# Patient Record
Sex: FEMALE | Race: WHITE | Employment: OTHER | URBAN - METROPOLITAN AREA
[De-identification: names, ages, dates, MRNs, and addresses within clinical notes are randomized per-mention and may not be internally consistent; named-entity substitution may affect disease eponyms.]

---

## 2017-01-18 ENCOUNTER — OFFICE VISIT (OUTPATIENT)
Dept: BEHAVIORAL/MENTAL HEALTH CLINIC | Age: 77
End: 2017-01-18

## 2017-01-18 VITALS
DIASTOLIC BLOOD PRESSURE: 83 MMHG | WEIGHT: 179 LBS | OXYGEN SATURATION: 92 % | HEIGHT: 64 IN | BODY MASS INDEX: 30.56 KG/M2 | SYSTOLIC BLOOD PRESSURE: 136 MMHG | HEART RATE: 99 BPM

## 2017-01-18 DIAGNOSIS — F60.9 PERSONALITY DISORDER (HCC): ICD-10-CM

## 2017-01-18 DIAGNOSIS — F41.8 DEPRESSION WITH ANXIETY: Primary | ICD-10-CM

## 2017-01-18 RX ORDER — ARIPIPRAZOLE 5 MG/1
TABLET ORAL
Refills: 2 | COMMUNITY
Start: 2016-12-29 | End: 2017-01-18 | Stop reason: SDUPTHER

## 2017-01-18 RX ORDER — VENLAFAXINE HYDROCHLORIDE 150 MG/1
CAPSULE, EXTENDED RELEASE ORAL
Qty: 30 CAP | Refills: 3 | Status: SHIPPED | OUTPATIENT
Start: 2017-01-18 | End: 2017-06-12 | Stop reason: SDUPTHER

## 2017-01-18 RX ORDER — ARIPIPRAZOLE 5 MG/1
TABLET ORAL
Qty: 30 TAB | Refills: 2 | Status: SHIPPED | OUTPATIENT
Start: 2017-01-18 | End: 2017-07-26 | Stop reason: SDUPTHER

## 2017-01-18 NOTE — PROGRESS NOTES
Psychiatric Outpatient Progress Note    Account Number:  [id number]  Name: Corinna Caro    SUBJECTIVE:   CHIEF COMPLAINT:  Corinna Caro is a 67 y.o. , white female and was seen today for follow-up of psychiatric condition and psychotropic medication management. She was brought by her visit. HPI:    Kaylah Zaragoza reports the following psychiatric symptoms:  depression and anxiety. The symptoms have been present for years and are of mild severity. The symptoms occur infrequently. Pt reports doing well. She had called on 12/6/17 with c/o tremors of UE. She saw her neurologist, who ruled out that se has PD and suggested that it may be due to Abilify. She was told to hold it for few weeks and see if that was the case. She reports that her mild tremers of fingers does not interfere with any fine functions of fingers like wearing clothes, holding utensils or cups etc. She was told that completely stopping Abilify may worsen her depression. No gross psychosis or [place]. Stays busy with house chores. Stable sleep/appetite. Contributing factors include: new onset of tremors. Patient denies SI/HI/SIB. Side Effects:  none      Fam/Soc Hx (from [place] with updates):       REVIEW OF SYSTEMS:  Psychiatric:  depression  Appetite:good   Sleep: good       OBJECTIVE:                 Mental Status exam: WNL except for      Sensorium  oriented to time, place and person   Relations cooperative and passive    Eye Contact    appropriate   Appearance:  age appropriate and casually dressed   Motor Behavior/Gait:  hypoactive   Speech:  normal pitch and normal volume   Thought Process: goal directed and logical   Thought Content free of delusions and free of hallucinations   Suicidal ideations none   Homicidal ideations none   Mood:  euthymic   Affect:  anxious   Memory recent  adequate   Memory remote:  adequate   Concentration:  adequate   Abstraction:  concrete   Insight:  fair   Reliability fair   Judgment:  fair MEDICAL DECISION MAKING  Data: pertinent labs, imaging, medical records and diagnostic tests reviewed and incorporated in diagnosis and treatment plan    No Known Allergies     Current Outpatient Prescriptions   Medication Sig Dispense Refill    ARIPiprazole (ABILIFY) 5 mg tablet 15TAKE 1/2 TABLET BY MOUTH EVERY DAY  Indications: DEPRESSION TREATMENT ADJUNCT 30 Tab 2    venlafaxine-SR (EFFEXOR-XR) 150 mg capsule TAKE ONE CAPSULE BY MOUTH AM after breakfast  Indications: ANXIETY WITH DEPRESSION 30 Cap 3    topiramate (TOPAMAX) 100 mg tablet Take 100 mg by mouth daily. Indications: trigeminal neuralgia  5    atorvastatin (LIPITOR) 20 mg tablet Take 20 mg by mouth daily. Indications: HYPERCHOLESTEROLEMIA      cholecalciferol (VITAMIN D3) 400 unit tab tablet Take 1,200 Units by mouth daily. Indications: VITAMIN D DEFICIENCY      aspirin delayed-release 325 mg tablet Take 325 mg by mouth daily. Indications: MYOCARDIAL INFARCTION PREVENTION      levETIRAcetam (KEPPRA) 500 mg tablet Take 500 mg by mouth daily. Indications: atypical trigeminal neuralgia      multivitamins-minerals-lutein (CENTRUM SILVER) tab tablet Take 1 Tab by mouth daily.  levothyroxine (SYNTHROID) 88 mcg tablet Take 88 mcg by mouth Daily (before breakfast). Indications: HYPOTHYROIDISM          Visit Vitals    /83 (BP 1 Location: Left arm, BP Patient Position: Sitting)    Pulse 99    Ht 5' 4\" (1.626 m)    Wt 81.2 kg (179 lb)    SpO2 92%    BMI 30.73 kg/m2         Problems addressed today:    ICD-10-CM ICD-9-CM    1. Depression with anxiety F41.8 300.4    2. Personality disorder F60.9 301.9        Assessment:   Mervin Apple  is a 67 y.o.  female  is responding to treatment. Symptoms are stable. Patient denies SI/HI/SIB. No evidence of AH/VH or delusions. Risk Scoring- chronic illnesses and prescription drug management    Treatment Plan:  1.   Medications:          Medication Changes/Adjustments: resume Abilify, continue Effexor in the current dose. Current Outpatient Prescriptions   Medication Sig Dispense Refill    ARIPiprazole (ABILIFY) 5 mg tablet 15TAKE 1/2 TABLET BY MOUTH EVERY DAY  Indications: DEPRESSION TREATMENT ADJUNCT 30 Tab 2    venlafaxine-SR (EFFEXOR-XR) 150 mg capsule TAKE ONE CAPSULE BY MOUTH AM after breakfast  Indications: ANXIETY WITH DEPRESSION 30 Cap 3    topiramate (TOPAMAX) 100 mg tablet Take 100 mg by mouth daily. Indications: trigeminal neuralgia  5    atorvastatin (LIPITOR) 20 mg tablet Take 20 mg by mouth daily. Indications: HYPERCHOLESTEROLEMIA      cholecalciferol (VITAMIN D3) 400 unit tab tablet Take 1,200 Units by mouth daily. Indications: VITAMIN D DEFICIENCY      aspirin delayed-release 325 mg tablet Take 325 mg by mouth daily. Indications: MYOCARDIAL INFARCTION PREVENTION      levETIRAcetam (KEPPRA) 500 mg tablet Take 500 mg by mouth daily. Indications: atypical trigeminal neuralgia      multivitamins-minerals-lutein (CENTRUM SILVER) tab tablet Take 1 Tab by mouth daily.  levothyroxine (SYNTHROID) 88 mcg tablet Take 88 mcg by mouth Daily (before breakfast). Indications: HYPOTHYROIDISM                    The following regarding medications was addressed:    (The risks and benefits of the proposed medication; the potential medication side effects ie    dry mouth, weight gain, GI upset, headache; patient given opportunity to ask questions)       2. Counseling and coordination of care including instructions for treatment, risks/benefits, risk factor reduction and patient/family education. She agrees with the plan. Patient instructed to call with any side effects, questions or issues. 3.  Follow-up Disposition:  Return in about 3 months (around 4/18/2017). PSYCHOTHERAPY:  approx 20 minutes  Type:  Supportive/Solution Focused psychotherapy provided  Focus:     Current problems:    Medical issues     Psychoeducation provided on the common causes of tremors.      Treatment plan reviewed with patient-including diagnosis and medications      Juwan Booker is progressing.     Glenn Adams MD  1/18/2017

## 2017-04-19 ENCOUNTER — OFFICE VISIT (OUTPATIENT)
Dept: BEHAVIORAL/MENTAL HEALTH CLINIC | Age: 77
End: 2017-04-19

## 2017-04-19 VITALS
SYSTOLIC BLOOD PRESSURE: 143 MMHG | DIASTOLIC BLOOD PRESSURE: 83 MMHG | BODY MASS INDEX: 30.56 KG/M2 | HEART RATE: 98 BPM | OXYGEN SATURATION: 98 % | WEIGHT: 179 LBS | HEIGHT: 64 IN

## 2017-04-19 DIAGNOSIS — F41.8 DEPRESSION WITH ANXIETY: Primary | ICD-10-CM

## 2017-04-19 DIAGNOSIS — F60.9 PERSONALITY DISORDER (HCC): ICD-10-CM

## 2017-04-19 PROBLEM — F32.A DEPRESSION: Status: ACTIVE | Noted: 2017-04-19

## 2017-04-19 PROBLEM — E07.9 THYROID DISEASE: Status: ACTIVE | Noted: 2017-04-19

## 2017-04-19 PROBLEM — G50.0 TRIGEMINAL NEURALGIA: Status: ACTIVE | Noted: 2017-04-19

## 2017-04-19 PROBLEM — E78.00 HYPERCHOLESTEROLEMIA: Status: ACTIVE | Noted: 2017-04-19

## 2017-04-19 NOTE — PROGRESS NOTES
Psychiatric Outpatient Progress Note    Account Number:  [id number]  Name: Jey Hook    SUBJECTIVE:   CHIEF COMPLAINT:  Jey Hook is a 65 y.o. , white female and was seen today for follow-up of psychiatric condition and psychotropic medication management. She was brought by her       HPI:   Arjun Palacio reports the following psychiatric symptoms: depression and anxiety. The symptoms have been present for years and are of mild severity. The symptoms occur infrequently. Pt reports doing well. Reports her Trigeminal neuralgia has quiet down with the combination of Keppra and Topamax. Stable sleep/appetite. Reports she is more active now and has started to drive short distances.      Contributing factors include: new onset of tremors.    Patient denies SI/HI/SIB.      Side Effects: none      Fam/Soc Hx (from [place] with updates):      REVIEW OF SYSTEMS:  Psychiatric: depression  Appetite:good   Sleep: good        OBJECTIVE:                 Mental Status exam: WNL except for      Sensorium  oriented to time, place and person   Relations cooperative    Eye Contact    appropriate   Appearance:  age appropriate, casually dressed and overweight   Motor Behavior/Gait:  within normal limits   Speech:  normal pitch and normal volume   Thought Process: goal directed and logical   Thought Content free of delusions and free of hallucinations   Suicidal ideations none   Homicidal ideations none   Mood:  euthymic   Affect:  full range   Memory recent  adequate   Memory remote:  adequate   Concentration:  adequate   Abstraction:  concrete   Insight:  fair   Reliability fair   Judgment:  fair       MEDICAL DECISION MAKING  Data: pertinent labs, imaging, medical records and diagnostic tests reviewed and incorporated in diagnosis and treatment plan    No Known Allergies     Current Outpatient Prescriptions   Medication Sig Dispense Refill    ARIPiprazole (ABILIFY) 5 mg tablet 15TAKE 1/2 TABLET BY MOUTH EVERY DAY  Indications: DEPRESSION TREATMENT ADJUNCT 30 Tab 2    venlafaxine-SR (EFFEXOR-XR) 150 mg capsule TAKE ONE CAPSULE BY MOUTH AM after breakfast  Indications: ANXIETY WITH DEPRESSION 30 Cap 3    topiramate (TOPAMAX) 100 mg tablet Take 100 mg by mouth daily. Indications: trigeminal neuralgia  5    atorvastatin (LIPITOR) 20 mg tablet Take 20 mg by mouth daily. Indications: HYPERCHOLESTEROLEMIA      cholecalciferol (VITAMIN D3) 400 unit tab tablet Take 1,200 Units by mouth daily. Indications: VITAMIN D DEFICIENCY      aspirin delayed-release 325 mg tablet Take 325 mg by mouth daily. Indications: MYOCARDIAL INFARCTION PREVENTION      levETIRAcetam (KEPPRA) 500 mg tablet Take 500 mg by mouth daily. Indications: atypical trigeminal neuralgia      multivitamins-minerals-lutein (CENTRUM SILVER) tab tablet Take 1 Tab by mouth daily.  levothyroxine (SYNTHROID) 88 mcg tablet Take 88 mcg by mouth Daily (before breakfast). Indications: HYPOTHYROIDISM          Visit Vitals    /83 (BP 1 Location: Left arm, BP Patient Position: Sitting)    Pulse 98    Ht 5' 4\" (1.626 m)    Wt 81.2 kg (179 lb)    SpO2 98%    BMI 30.73 kg/m2         Problems addressed today:    ICD-10-CM ICD-9-CM    1. Depression with anxiety F41.8 300.4    2. Personality disorder F60.9 301.9        Assessment:   Ja Angel  is a 65 y.o.  female  is responding to treatment. Symptoms are stable. Patient denies SI/HI/SIB. No evidence of AH/VH or delusions. Risk Scoring- chronic illnesses and prescription drug management    Treatment Plan:  1. Medications:          Medication Changes/Adjustments: Continue Effexor and Abilify in the current dosages.      Current Outpatient Prescriptions   Medication Sig Dispense Refill    ARIPiprazole (ABILIFY) 5 mg tablet 15TAKE 1/2 TABLET BY MOUTH EVERY DAY  Indications: DEPRESSION TREATMENT ADJUNCT 30 Tab 2    venlafaxine-SR (EFFEXOR-XR) 150 mg capsule TAKE ONE CAPSULE BY MOUTH AM after breakfast  Indications: ANXIETY WITH DEPRESSION 30 Cap 3    topiramate (TOPAMAX) 100 mg tablet Take 100 mg by mouth daily. Indications: trigeminal neuralgia  5    atorvastatin (LIPITOR) 20 mg tablet Take 20 mg by mouth daily. Indications: HYPERCHOLESTEROLEMIA      cholecalciferol (VITAMIN D3) 400 unit tab tablet Take 1,200 Units by mouth daily. Indications: VITAMIN D DEFICIENCY      aspirin delayed-release 325 mg tablet Take 325 mg by mouth daily. Indications: MYOCARDIAL INFARCTION PREVENTION      levETIRAcetam (KEPPRA) 500 mg tablet Take 500 mg by mouth daily. Indications: atypical trigeminal neuralgia      multivitamins-minerals-lutein (CENTRUM SILVER) tab tablet Take 1 Tab by mouth daily.  levothyroxine (SYNTHROID) 88 mcg tablet Take 88 mcg by mouth Daily (before breakfast). Indications: HYPOTHYROIDISM                    The following regarding medications was addressed:    (The risks and benefits of the proposed medication; the potential medication side effects ie    dry mouth, weight gain, GI upset, headache; patient given opportunity to ask questions)       2. Counseling and coordination of care including instructions for treatment, risks/benefits, risk factor reduction and patient/family education. She agrees with the plan. Patient instructed to call with any side effects, questions or issues. 3.  Follow-up Disposition:  Return in about 4 months (around 8/19/2017). 4. Encouraged daily walks and portion control. PSYCHOTHERAPY:  approx 20 minutes  Type:  Supportive/Cognitive Behavioral/Solution Focused psychotherapy provided  Focus:     Current problems:   Medical issues- obesity     Psychoeducation provided on psych medications. Treatment plan reviewed with patient-including diagnosis and medications      Soraida Johannaabimaels is slowly  progressing.     Mckenna Burnett MD  4/19/2017

## 2017-06-12 RX ORDER — VENLAFAXINE HYDROCHLORIDE 150 MG/1
CAPSULE, EXTENDED RELEASE ORAL
Qty: 30 CAP | Refills: 3 | Status: SHIPPED | OUTPATIENT
Start: 2017-06-12 | End: 2017-10-14 | Stop reason: SDUPTHER

## 2017-07-26 RX ORDER — ARIPIPRAZOLE 5 MG/1
TABLET ORAL
Qty: 30 TAB | Refills: 0 | Status: SHIPPED | OUTPATIENT
Start: 2017-07-26 | End: 2017-10-06 | Stop reason: SDUPTHER

## 2017-08-21 ENCOUNTER — OFFICE VISIT (OUTPATIENT)
Dept: BEHAVIORAL/MENTAL HEALTH CLINIC | Age: 77
End: 2017-08-21

## 2017-08-21 VITALS
HEIGHT: 64 IN | WEIGHT: 180 LBS | SYSTOLIC BLOOD PRESSURE: 135 MMHG | BODY MASS INDEX: 30.73 KG/M2 | DIASTOLIC BLOOD PRESSURE: 85 MMHG | HEART RATE: 102 BPM | OXYGEN SATURATION: 96 %

## 2017-08-21 DIAGNOSIS — F60.9 PERSONALITY DISORDER (HCC): ICD-10-CM

## 2017-08-21 DIAGNOSIS — F41.8 DEPRESSION WITH ANXIETY: Primary | ICD-10-CM

## 2017-08-21 NOTE — PROGRESS NOTES
Psychiatric Outpatient Progress Note    Account Number:  [id number]  Name: Abad Cousin    SUBJECTIVE:   CHIEF COMPLAINT:  Alpha Cousin is a 65 y.o. , white female and was seen today for follow-up of psychiatric condition and psychotropic medication management. She was brought by her        HPI:   Jayashree Lou reports the following psychiatric symptoms: depression and anxiety. The symptoms have been present for years and are of mild severity. The symptoms occur infrequently. Pt reports doing well. Stable sleep/appetite. They have been travelling some locally. Reports her Trigeminal neuralgia has quiet down with the combination of Keppra and Topamax. Reports she is more active now and has started to drive short distances.       Contributing factors include: new onset of tremors. Patient denies SI/HI/SIB.        Side Effects: none       Fam/Soc Hx (from [place] with updates):       REVIEW OF SYSTEMS:  Psychiatric: depression  Appetite:good   Sleep: good        OBJECTIVE:                 Mental Status exam: WNL except for      Sensorium  oriented to time, place and person   Relations cooperative    Eye Contact    appropriate   Appearance:  age appropriate and casually dressed   Motor Behavior/Gait:  within normal limits   Speech:  normal pitch and normal volume   Thought Process: goal directed and logical   Thought Content free of delusions and free of hallucinations   Suicidal ideations none   Homicidal ideations none   Mood:  euthymic   Affect:  full range   Memory recent  adequate   Memory remote:  adequate   Concentration:  adequate   Abstraction:  concrete   Insight:  fair   Reliability fair   Judgment:  fair       MEDICAL DECISION MAKING  Data: pertinent labs, imaging, medical records and diagnostic tests reviewed and incorporated in diagnosis and treatment plan    No Known Allergies     Current Outpatient Prescriptions   Medication Sig Dispense Refill    ARIPiprazole (ABILIFY) 5 mg tablet TAKE 1/2 TABLET BY MOUTH EVERY DAY, INDICATIONS: DEPRESSION TREATMENT ADJUNCT 30 Tab 0    venlafaxine-SR (EFFEXOR-XR) 150 mg capsule TAKE ONE CAPSULE BY MOUTH AM after breakfast  Indications: ANXIETY WITH DEPRESSION 30 Cap 3    topiramate (TOPAMAX) 100 mg tablet Take 100 mg by mouth daily. Indications: trigeminal neuralgia  5    atorvastatin (LIPITOR) 20 mg tablet Take 20 mg by mouth daily. Indications: HYPERCHOLESTEROLEMIA      cholecalciferol (VITAMIN D3) 400 unit tab tablet Take 1,200 Units by mouth daily. Indications: VITAMIN D DEFICIENCY      aspirin delayed-release 325 mg tablet Take 325 mg by mouth daily. Indications: MYOCARDIAL INFARCTION PREVENTION      levETIRAcetam (KEPPRA) 500 mg tablet Take 500 mg by mouth daily. Indications: atypical trigeminal neuralgia      multivitamins-minerals-lutein (CENTRUM SILVER) tab tablet Take 1 Tab by mouth daily.  levothyroxine (SYNTHROID) 88 mcg tablet Take 88 mcg by mouth Daily (before breakfast). Indications: HYPOTHYROIDISM          Visit Vitals    /85 (BP 1 Location: Left arm, BP Patient Position: Sitting)    Pulse (!) 102    Ht 5' 4\" (1.626 m)    Wt 81.6 kg (180 lb)    SpO2 96%    BMI 30.9 kg/m2         Problems addressed today:    ICD-10-CM ICD-9-CM    1. Depression with anxiety F41.8 300.4    2. Personality disorder F60.9 301.9        Assessment:   Alpha Cousin  is a 65 y.o.  female  is responding to treatment. Symptoms are stable. Patient denies SI/HI/SIB. No evidence of AH/VH or delusions. Risk Scoring- chronic illnesses and prescription drug management    Treatment Plan:  1.   Medications:          Medication Changes/Adjustments: continue current combination of Effexor and Abilify    Current Outpatient Prescriptions   Medication Sig Dispense Refill    ARIPiprazole (ABILIFY) 5 mg tablet TAKE 1/2 TABLET BY MOUTH EVERY DAY, INDICATIONS: DEPRESSION TREATMENT ADJUNCT 30 Tab 0    venlafaxine-SR (EFFEXOR-XR) 150 mg capsule TAKE ONE CAPSULE BY MOUTH AM after breakfast  Indications: ANXIETY WITH DEPRESSION 30 Cap 3    topiramate (TOPAMAX) 100 mg tablet Take 100 mg by mouth daily. Indications: trigeminal neuralgia  5    atorvastatin (LIPITOR) 20 mg tablet Take 20 mg by mouth daily. Indications: HYPERCHOLESTEROLEMIA      cholecalciferol (VITAMIN D3) 400 unit tab tablet Take 1,200 Units by mouth daily. Indications: VITAMIN D DEFICIENCY      aspirin delayed-release 325 mg tablet Take 325 mg by mouth daily. Indications: MYOCARDIAL INFARCTION PREVENTION      levETIRAcetam (KEPPRA) 500 mg tablet Take 500 mg by mouth daily. Indications: atypical trigeminal neuralgia      multivitamins-minerals-lutein (CENTRUM SILVER) tab tablet Take 1 Tab by mouth daily.  levothyroxine (SYNTHROID) 88 mcg tablet Take 88 mcg by mouth Daily (before breakfast). Indications: HYPOTHYROIDISM                    The following regarding medications was addressed:    (The risks and benefits of the proposed medication; the potential medication side effects ie    dry mouth, weight gain, GI upset, headache; patient given opportunity to ask questions)       2. Counseling and coordination of care including instructions for treatment, risks/benefits, risk factor reduction and patient/family education. She agrees with the plan. Patient instructed to call with any side effects, questions or issues. 3.  Follow-up Disposition:  Return in about 4 months (around 12/21/2017). PSYCHOTHERAPY:  approx 20 minutes  Type:  Supportive/Solution Focused psychotherapy provided  Focus:     Current problems:     Psychoeducation provided on psych medications    Treatment plan reviewed with patient-including diagnosis and medications      Cesar Pierre is stable.       Kosta Boone MD  8/21/2017

## 2017-10-06 RX ORDER — ARIPIPRAZOLE 5 MG/1
TABLET ORAL
Qty: 30 TAB | Refills: 0 | Status: SHIPPED | OUTPATIENT
Start: 2017-10-06 | End: 2017-11-20 | Stop reason: SDUPTHER

## 2017-10-16 RX ORDER — VENLAFAXINE HYDROCHLORIDE 150 MG/1
CAPSULE, EXTENDED RELEASE ORAL
Qty: 30 CAP | Refills: 3 | Status: SHIPPED | OUTPATIENT
Start: 2017-10-16 | End: 2018-03-04 | Stop reason: SDUPTHER

## 2017-11-20 ENCOUNTER — OFFICE VISIT (OUTPATIENT)
Dept: BEHAVIORAL/MENTAL HEALTH CLINIC | Age: 77
End: 2017-11-20

## 2017-11-20 VITALS
SYSTOLIC BLOOD PRESSURE: 141 MMHG | HEIGHT: 64 IN | DIASTOLIC BLOOD PRESSURE: 93 MMHG | OXYGEN SATURATION: 94 % | BODY MASS INDEX: 30.73 KG/M2 | HEART RATE: 95 BPM | WEIGHT: 180 LBS

## 2017-11-20 DIAGNOSIS — F41.8 DEPRESSION WITH ANXIETY: Primary | ICD-10-CM

## 2017-11-20 DIAGNOSIS — F60.9 PERSONALITY DISORDER (HCC): ICD-10-CM

## 2017-11-20 RX ORDER — ARIPIPRAZOLE 5 MG/1
TABLET ORAL
Qty: 30 TAB | Refills: 3 | Status: SHIPPED | OUTPATIENT
Start: 2017-11-20 | End: 2018-09-11 | Stop reason: SDUPTHER

## 2017-11-20 NOTE — PROGRESS NOTES
Psychiatric Outpatient Progress Note    Account Number:  [id number]  Name: Hernan Willis    SUBJECTIVE:   CHIEF COMPLAINT:  Hernan Willis is a 77 y.o. , white female and was seen today for follow-up of psychiatric condition and psychotropic medication management. She was brought by her        HPI:   Brett reports the following psychiatric symptoms: depression and anxiety. The symptoms have been present for years and are of mild-moderate severity. The symptoms occur infrequently. Pt reports doing Ok. She was Dx with COPD recently after going thru testing by pulmonologist for increasing SOB over past few months. She is still under investigation and was started on steroid inhaler. She was cleared by the cardiologist for any serious cardiac illness. Pt reported that she stopped smoking 15 years ago but was heavy smoker for a long time. Reports sleeping and eating well. Compliant with medications. Stable weight. Denies any psychosis or [place].       Contributing factors include: new COPD dx  Patient denies SI/HI/SIB.        Side Effects: none       Fam/Soc Hx (from [facility] with updates):       REVIEW OF SYSTEMS:  Constitutional: positive for fatigue  Respiratory: positive for dyspnea on exertion  Cardiovascular: negative for chest pain, palpitations  Musculoskeletal:positive for myalgias and arthralgias  Neurological: positive for memory problems and gait problems  Behavioral/Psych: positive for anxiety, negative for sleep disturbance and tobacco use  Appetite:good   Sleep: good   Visit Vitals    BP (!) 141/93 (BP 1 Location: Left arm, BP Patient Position: Sitting)    Pulse 95    Ht 5' 4\" (1.626 m)    Wt 81.6 kg (180 lb)    SpO2 94%    BMI 30.9 kg/m2       OBJECTIVE:                 Mental Status exam: WNL except for      Sensorium  oriented to time, place and person   Relations cooperative and passive    Eye Contact    appropriate   Appearance:  age appropriate, casually dressed and overweight   Motor Behavior/Gait:  hypoactive and gait unsteady   Speech:  normal pitch and normal volume   Thought Process: goal directed and logical   Thought Content free of delusions and free of hallucinations   Suicidal ideations none   Homicidal ideations none   Mood:  euthymic   Affect:  anxious   Memory recent  impaired-mild   Memory remote:  adequate   Concentration:  adequate   Abstraction:  concrete   Insight:  fair   Reliability fair   Judgment:  fair       MEDICAL DECISION MAKING  Data: pertinent labs, imaging, medical records and diagnostic tests reviewed and incorporated in diagnosis and treatment plan    No Known Allergies     Current Outpatient Prescriptions   Medication Sig Dispense Refill    ARIPiprazole (ABILIFY) 5 mg tablet TAKE 1/2 TABLET BY MOUTH EVERY DAY, INDICATIONS: DEPRESSION TREATMENT ADJUNCT 30 Tab 3    venlafaxine-SR (EFFEXOR-XR) 150 mg capsule TAKE ONE CAPSULE BY MOUTH EVERY MORNING AFTER BREAKFAST 30 Cap 3    topiramate (TOPAMAX) 100 mg tablet Take 100 mg by mouth daily. Indications: trigeminal neuralgia  5    atorvastatin (LIPITOR) 20 mg tablet Take 20 mg by mouth daily. Indications: HYPERCHOLESTEROLEMIA      cholecalciferol (VITAMIN D3) 400 unit tab tablet Take 1,200 Units by mouth daily. Indications: VITAMIN D DEFICIENCY      aspirin delayed-release 325 mg tablet Take 325 mg by mouth daily. Indications: MYOCARDIAL INFARCTION PREVENTION      levETIRAcetam (KEPPRA) 500 mg tablet Take 500 mg by mouth daily. Indications: atypical trigeminal neuralgia      multivitamins-minerals-lutein (CENTRUM SILVER) tab tablet Take 1 Tab by mouth daily.  levothyroxine (SYNTHROID) 88 mcg tablet Take 88 mcg by mouth Daily (before breakfast). Indications: HYPOTHYROIDISM            Problems addressed today:    ICD-10-CM ICD-9-CM    1. Depression with anxiety F41.8 300.4    2.  Personality disorder F60.9 301.9        Assessment:   Camelia Anderson  is a 65 y.o.  female  is responding to treatment. Symptoms are stable. Patient denies SI/HI/SIB. No evidence of AH/VH or delusions. Risk Scoring- chronic illnesses and prescription drug management    Treatment Plan:  1. Medications:          Medication Changes/Adjustments: Continue Abilify and Effexor in the current dosages. Current Outpatient Prescriptions   Medication Sig Dispense Refill    ARIPiprazole (ABILIFY) 5 mg tablet TAKE 1/2 TABLET BY MOUTH EVERY DAY, INDICATIONS: DEPRESSION TREATMENT ADJUNCT 30 Tab 3    venlafaxine-SR (EFFEXOR-XR) 150 mg capsule TAKE ONE CAPSULE BY MOUTH EVERY MORNING AFTER BREAKFAST 30 Cap 3    topiramate (TOPAMAX) 100 mg tablet Take 100 mg by mouth daily. Indications: trigeminal neuralgia  5    atorvastatin (LIPITOR) 20 mg tablet Take 20 mg by mouth daily. Indications: HYPERCHOLESTEROLEMIA      cholecalciferol (VITAMIN D3) 400 unit tab tablet Take 1,200 Units by mouth daily. Indications: VITAMIN D DEFICIENCY      aspirin delayed-release 325 mg tablet Take 325 mg by mouth daily. Indications: MYOCARDIAL INFARCTION PREVENTION      levETIRAcetam (KEPPRA) 500 mg tablet Take 500 mg by mouth daily. Indications: atypical trigeminal neuralgia      multivitamins-minerals-lutein (CENTRUM SILVER) tab tablet Take 1 Tab by mouth daily.  levothyroxine (SYNTHROID) 88 mcg tablet Take 88 mcg by mouth Daily (before breakfast). Indications: HYPOTHYROIDISM                    The following regarding medications was addressed:    (The risks and benefits of the proposed medication; the potential medication side effects ie    dry mouth, weight gain, GI upset, headache; patient given opportunity to ask questions)       2. Counseling and coordination of care including instructions for treatment, risks/benefits, risk factor reduction and patient/family education. She agrees with the plan. Patient instructed to call with any side effects, questions or issues.      3. Pt and  were provided supportive counseling on their stress of new Dx of COPD and her limited activity due to SOB on exertion. Some coping strategies discussed. PSYCHOTHERAPY:  approx 20 minutes  Type:  Supportive/Cognitive Behavioral/Solution Focused psychotherapy provided  Focus:     Current problems:   Medical issues       Psychoeducation provided:  Psych medications    Treatment plan reviewed with patient-including diagnosis and medications    Aga Walsh is stable. Follow-up Disposition:  Return in about 4 months (around 3/20/2018).       Anna Whitaker MD  11/20/2017

## 2017-12-27 ENCOUNTER — HOSPITAL ENCOUNTER (OUTPATIENT)
Dept: CT IMAGING | Age: 77
Discharge: HOME | End: 2017-12-27
Attending: NURSE PRACTITIONER
Payer: MEDICARE

## 2017-12-27 DIAGNOSIS — R94.2 DECREASED DIFFUSION CAPACITY OF LUNG: ICD-10-CM

## 2017-12-27 PROCEDURE — 71250 CT THORAX DX C-: CPT

## 2018-04-09 ENCOUNTER — OFFICE VISIT (OUTPATIENT)
Dept: BEHAVIORAL/MENTAL HEALTH CLINIC | Age: 78
End: 2018-04-09

## 2018-04-09 VITALS
HEIGHT: 64 IN | SYSTOLIC BLOOD PRESSURE: 149 MMHG | DIASTOLIC BLOOD PRESSURE: 83 MMHG | BODY MASS INDEX: 30.05 KG/M2 | HEART RATE: 111 BPM | WEIGHT: 176 LBS

## 2018-04-09 DIAGNOSIS — F41.8 DEPRESSION WITH ANXIETY: Primary | ICD-10-CM

## 2018-04-09 DIAGNOSIS — F60.9 PERSONALITY DISORDER (HCC): ICD-10-CM

## 2018-04-09 PROBLEM — F32.A DEPRESSION: Status: RESOLVED | Noted: 2017-04-19 | Resolved: 2018-04-09

## 2018-04-09 RX ORDER — VENLAFAXINE HYDROCHLORIDE 150 MG/1
CAPSULE, EXTENDED RELEASE ORAL
Qty: 90 CAP | Refills: 0 | Status: SHIPPED | OUTPATIENT
Start: 2018-04-09 | End: 2018-04-18 | Stop reason: SDUPTHER

## 2018-04-09 NOTE — MR AVS SNAPSHOT
[address] [facility] 
 
 
 [address] [facility] [address] [place] [address] 
[phone] Patient: Beata Chambers MRN: [id number] Visit Information Date & Time Provider Department Dept. Phone Encounter #  
 4/9/2018  2:00 PM Ezio Tamez MD [facility]  Follow-up Instructions Return in about 4 months (around 8/9/2018). Your Appointments 8/8/2018  2:00 PM  
ESTABLISHED PATIENT with Ezio Tamez MD  
[facility] Appt Note: 4 month follow-up [address] [facility] [address] [place] [address] [facility] [address]  
  
   
 [address] [facility] [address] [facility] [address] [id number] Upcoming Health Maintenance Date Due DTaP/Tdap/Td series (1 - Tdap) 2/1/1960 ZOSTER VACCINE AGE 60> 12/1/1998 GLAUCOMA SCREENING Q2Y 2/1/2004 Bone Densitometry (Dexa) Screening 2/1/2004 Pneumococcal 65+ Low/Medium Risk (1 of 2 - PCV13) 2/1/2004 Influenza Age 5 to Adult 8/1/2017 MEDICARE YEARLY EXAM 3/20/2018 Allergies as of 4/9/2018  Review Complete On: 4/9/2018 By: Ezio Tamez MD  
 No Known Allergies Current Immunizations  Never Reviewed No immunizations on file. Not reviewed this visit You Were Diagnosed With   
  
 Codes Comments Depression with anxiety    -  Primary ICD-10-CM: F41.8 ICD-9-CM: 300.4 Personality disorder     ICD-10-CM: F60.9 ICD-9-CM: 301.9 Vitals BP Pulse Height(growth percentile) Weight(growth percentile) BMI OB Status 149/83 (!) 111 5' 4\" (1.626 m) 176 lb (79.8 kg) 30.21 kg/m2 Postmenopausal  
 Smoking Status Former Smoker Vitals History BMI and BSA Data Body Mass Index Body Surface Area  
 30.21 kg/m 2 1.9 m 2 Preferred Pharmacy Pharmacy Name Phone [id number] [place] [id number] [facility] [address] [phone] Your Updated Medication List  
  
   
This list is accurate as of 4/9/18  2:13 PM.  Always use your most recent med list.  
  
  
  
  
 ARIPiprazole 5 mg tablet Commonly known as:  ABILIFY TAKE 1/2 TABLET BY MOUTH EVERY DAY, INDICATIONS: DEPRESSION TREATMENT ADJUNCT  
  
 aspirin delayed-release 325 mg tablet Take 325 mg by mouth daily. Indications: MYOCARDIAL INFARCTION PREVENTION  
  
 atorvastatin 20 mg tablet Commonly known as:  LIPITOR Take 20 mg by mouth daily. Indications: HYPERCHOLESTEROLEMIA CENTRUM SILVER Tab tablet Generic drug:  multivitamins-minerals-lutein Take 1 Tab by mouth daily. cholecalciferol 400 unit Tab tablet Commonly known as:  VITAMIN D3 Take 1,200 Units by mouth daily. Indications: VITAMIN D DEFICIENCY  
  
 ESBRIET PO Take  by mouth.  
  
 levETIRAcetam 500 mg tablet Commonly known as:  KEPPRA Take 500 mg by mouth daily. Indications: atypical trigeminal neuralgia SYNTHROID 88 mcg tablet Generic drug:  levothyroxine Take 88 mcg by mouth Daily (before breakfast). Indications: HYPOTHYROIDISM  
  
 topiramate 100 mg tablet Commonly known as:  TOPAMAX Take 100 mg by mouth daily. Indications: trigeminal neuralgia  
  
 venlafaxine- mg capsule Commonly known as:  EFFEXOR-XR  
TAKE ONE CAPSULE BY MOUTH EVERY MORNING AFTER BREAKFAST Follow-up Instructions Return in about 4 months (around 8/9/2018). Introducing [facility]! [employer] introduces [employer] patient portal. Now you can access parts of your medical record, email your doctor's office, and request medication refills online. 1. In your internet browser, go to [email or web address] 2. Click on the First Time User? Click Here link in the Sign In box. You will see the New Member Sign Up page. 3. Enter your [employer] Access Code exactly as it appears below.  You will not need to use this code after youve completed the sign-up process. If you do not sign up before the expiration date, you must request a new code. · [employer] Access Code: [email or web address] Expires: 7/8/2018  2:13 PM 
 
4. Enter the last four digits of your Social Security Number (xxxx) and Date of Birth (mm/dd/yyyy) as indicated and click Submit. You will be taken to the next sign-up page. 5. Create a [employer] ID. This will be your [employer] login ID and cannot be changed, so think of one that is secure and easy to remember. 6. Create a [employer] password. You can change your password at any time. 7. Enter your Password Reset Question and Answer. This can be used at a later time if you forget your password. 8. Enter your e-mail address. You will receive e-mail notification when new information is available in [address]. 9. Click Sign Up. You can now view and download portions of your medical record. 10. Click the Download Summary menu link to download a portable copy of your medical information. If you have questions, please visit the Frequently Asked Questions section of the [employer] website. Remember, [employer] is NOT to be used for urgent needs. For medical emergencies, dial 911. Now available from your iPhone and Android! Please provide this summary of care documentation to your next provider. Your primary care clinician is listed as Flynn Bee. If you have any questions after today's visit, please call [phone].

## 2018-04-09 NOTE — PROGRESS NOTES
Psychiatric Outpatient Progress Note    Account Number:  [id number]  Name: Omi Smith    SUBJECTIVE:   CHIEF COMPLAINT:  Omi Smith is a 74 y.o. , white female and was seen today for 4 month follow-up of psychiatric condition and psychotropic medication management. She was brought by her        HPI:   Marya Hightower reports the following psychiatric symptoms: depression and anxiety. The symptoms have been present for years and are of mild-moderate severity. The symptoms occur infrequently.      Pt reports doing Ok. She reported that her diagnosis of pulmonary condition was changed from COPD to pulmonary fibrosis and her condition was described as severe. She has to require oxygen soon which is an old been arranged at home. She also started on a new medication called, ESBRIET. Se has not felt ant better so far. Pt reported that she stopped smoking 15 years ago but was heavy smoker for a long time. She denies any psychosis or [place]. They are planning to go on river cruise and visited [facility].  is meticulously planning for the trip so that she can have enough oxygen during the flight and during the cruise. Patient appears to be overwhelmed by the new diagnosis and appears worried. .Reports sleeping and eating well. Compliant with medications. Stable weight. BMI = 30. BP is WNL. At this time her oxygenation is in mid 90s at room air. No seizures in last one year.       Contributing factors include: new COPD dx  Patient denies SI/HI/SIB.        Side Effects: none       Fam/Soc Hx (from [facility] Eval with updates):        REVIEW OF SYSTEMS:  Constitutional: positive for fatigue  Respiratory: positive for dyspnea on exertion  Cardiovascular: negative for chest pain, palpitations  Musculoskeletal:positive for myalgias and arthralgias  Neurological: positive for memory problems and gait problems  Behavioral/Psych: positive for anxiety, negative for sleep disturbance and tobacco use  Appetite:good Sleep: good     Visit Vitals    /83    Pulse (!) 111    Ht 5' 4\" (1.626 m)    Wt 79.8 kg (176 lb)    BMI 30.21 kg/m2       OBJECTIVE:                 Mental Status exam: WNL except for      Sensorium  oriented to time, place and person   Relations cooperative and passive    Eye Contact    appropriate   Appearance:  age appropriate and casually dressed   Motor Behavior/Gait:  within normal limits   Speech:  normal pitch and normal volume   Thought Process: goal directed and logical   Thought Content free of delusions and free of hallucinations   Suicidal ideations none   Homicidal ideations none   Mood:  euthymic   Affect:  anxious   Memory recent  adequate   Memory remote:  adequate   Concentration:  impaired   Abstraction:  concrete   Insight:  fair   Reliability fair   Judgment:  fair       MEDICAL DECISION MAKING  Data: pertinent labs, imaging, medical records and diagnostic tests reviewed and incorporated in diagnosis and treatment plan    No Known Allergies     Current Outpatient Prescriptions   Medication Sig Dispense Refill    venlafaxine-SR (EFFEXOR-XR) 150 mg capsule TAKE ONE CAPSULE BY MOUTH EVERY MORNING AFTER BREAKFAST 90 Cap 0    PIRFENIDONE (ESBRIET PO) Take  by mouth.  ARIPiprazole (ABILIFY) 5 mg tablet TAKE 1/2 TABLET BY MOUTH EVERY DAY, INDICATIONS: DEPRESSION TREATMENT ADJUNCT 30 Tab 3    topiramate (TOPAMAX) 100 mg tablet Take 100 mg by mouth daily. Indications: trigeminal neuralgia  5    atorvastatin (LIPITOR) 20 mg tablet Take 20 mg by mouth daily. Indications: HYPERCHOLESTEROLEMIA      cholecalciferol (VITAMIN D3) 400 unit tab tablet Take 1,200 Units by mouth daily. Indications: VITAMIN D DEFICIENCY      aspirin delayed-release 325 mg tablet Take 325 mg by mouth daily. Indications: MYOCARDIAL INFARCTION PREVENTION      levETIRAcetam (KEPPRA) 500 mg tablet Take 500 mg by mouth daily.  Indications: atypical trigeminal neuralgia      multivitamins-minerals-lutein (CENTRUM SILVER) tab tablet Take 1 Tab by mouth daily.  levothyroxine (SYNTHROID) 88 mcg tablet Take 88 mcg by mouth Daily (before breakfast). Indications: HYPOTHYROIDISM            Problems addressed today:    ICD-10-CM ICD-9-CM    1. Depression with anxiety F41.8 300.4    2. Personality disorder F60.9 301.9        Assessment:   Arsalan Whitaker  is a 77 y.o.  female  is responding to treatment. Symptoms are stable. Patient denies SI/HI/SIB. No evidence of AH/VH or delusions. Risk Scoring- chronic illnesses and prescription drug management    Treatment Plan:  1. Medications:          Medication Changes/Adjustments: Continue current combination of Effexor and Abilify. Current Outpatient Prescriptions   Medication Sig Dispense Refill    venlafaxine-SR (EFFEXOR-XR) 150 mg capsule TAKE ONE CAPSULE BY MOUTH EVERY MORNING AFTER BREAKFAST 90 Cap 0    PIRFENIDONE (ESBRIET PO) Take  by mouth.  ARIPiprazole (ABILIFY) 5 mg tablet TAKE 1/2 TABLET BY MOUTH EVERY DAY, INDICATIONS: DEPRESSION TREATMENT ADJUNCT 30 Tab 3    topiramate (TOPAMAX) 100 mg tablet Take 100 mg by mouth daily. Indications: trigeminal neuralgia  5    atorvastatin (LIPITOR) 20 mg tablet Take 20 mg by mouth daily. Indications: HYPERCHOLESTEROLEMIA      cholecalciferol (VITAMIN D3) 400 unit tab tablet Take 1,200 Units by mouth daily. Indications: VITAMIN D DEFICIENCY      aspirin delayed-release 325 mg tablet Take 325 mg by mouth daily. Indications: MYOCARDIAL INFARCTION PREVENTION      levETIRAcetam (KEPPRA) 500 mg tablet Take 500 mg by mouth daily. Indications: atypical trigeminal neuralgia      multivitamins-minerals-lutein (CENTRUM SILVER) tab tablet Take 1 Tab by mouth daily.  levothyroxine (SYNTHROID) 88 mcg tablet Take 88 mcg by mouth Daily (before breakfast).  Indications: HYPOTHYROIDISM                    The following regarding medications was addressed:    (The risks and benefits of the proposed medication; the potential medication side effects ie    dry mouth, weight gain, GI upset, headache; patient given opportunity to ask questions)       2. Counseling and coordination of care including instructions for treatment, risks/benefits, risk factor reduction and patient/family education. She/ agrees with the plan. Patient/ instructed to call with any side effects, questions or issues. 3.  Patient was provided supportive therapy for her new diagnosis of pulmonary fibrosis. Short-term and long-term planning about her care was discussed.  was provided supportive therapy for his caregiver burden. PSYCHOTHERAPY:  approx 20 minutes  Type:  Supportive/Solution Focused psychotherapy provided  Focus:     Current problems:   New Dx    Medical issues    Psychoeducation provided: psych medications. Treatment plan reviewed with patient/-including diagnosis and medications    Felipe Olson is stable. Follow-up Disposition:  Return in about 4 months (around 8/9/2018).       Meaghan Garza MD  4/9/2018

## 2018-04-18 RX ORDER — VENLAFAXINE HYDROCHLORIDE 150 MG/1
CAPSULE, EXTENDED RELEASE ORAL
Qty: 90 CAP | Refills: 0 | Status: SHIPPED | OUTPATIENT
Start: 2018-04-18 | End: 2018-11-05 | Stop reason: SDUPTHER

## 2018-06-29 ENCOUNTER — HOSPITAL ENCOUNTER (OUTPATIENT)
Dept: GENERAL RADIOLOGY | Age: 78
Discharge: HOME | End: 2018-06-29
Payer: MEDICARE

## 2018-06-29 DIAGNOSIS — J84.10 PULMONARY FIBROSIS (HCC): ICD-10-CM

## 2018-06-29 PROCEDURE — 71046 X-RAY EXAM CHEST 2 VIEWS: CPT

## 2018-08-08 ENCOUNTER — OFFICE VISIT (OUTPATIENT)
Dept: BEHAVIORAL/MENTAL HEALTH CLINIC | Age: 78
End: 2018-08-08

## 2018-08-08 VITALS
DIASTOLIC BLOOD PRESSURE: 82 MMHG | OXYGEN SATURATION: 95 % | WEIGHT: 158.4 LBS | SYSTOLIC BLOOD PRESSURE: 130 MMHG | BODY MASS INDEX: 27.04 KG/M2 | HEIGHT: 64 IN | HEART RATE: 102 BPM | RESPIRATION RATE: 16 BRPM

## 2018-08-08 DIAGNOSIS — F60.9 PERSONALITY DISORDER (HCC): ICD-10-CM

## 2018-08-08 DIAGNOSIS — F41.8 DEPRESSION WITH ANXIETY: Primary | ICD-10-CM

## 2018-08-08 RX ORDER — METHYLPREDNISOLONE 4 MG/1
TABLET ORAL
Refills: 0 | COMMUNITY
Start: 2018-08-05 | End: 2018-12-11

## 2018-08-08 NOTE — PROGRESS NOTES
Psychiatric Outpatient Progress Note    Account Number:  [id number]  Name: Alberta Her    SUBJECTIVE:   CHIEF COMPLAINT:  Alberta Her is a 74 y.o. , white female and was seen today for 4 month follow-up of psychiatric condition and psychotropic medication management. She was brought by her        HPI:   Yariel Garcia reports the following psychiatric symptoms: depression and anxiety. The symptoms have been present for years and are of mild-moderate severity. The symptoms occur infrequently.       Pt reports she is doing ok emotionally. She has lost lot of weight because she has no appetite. She has lost appetite after she was started on ESBRIET for her pulmonary fibrosis. She developed severe skin rash few weeks ago ad it was attributed to [address].  stopped it and PCP has put her on prednisone. They are planning to go back to her pulmonologist to discuss about resuming her ESBRIET. Se has not felt ant better so far. She denies any psychosis or [place]. Their river cruise went well. Compliant with medications. Weight down by 18 lbs. BMI =  27.  BP is WNL. No seizures in last one year.       Contributing factors include: new COPD dx    Patient denies SI/HI/SIB.        Side Effects: none       Fam/Soc Hx (from [facility] Eval with updates):         REVIEW OF SYSTEMS:  Constitutional: positive for fatigue, severe weight loss  Respiratory: positive for dyspnea on exertion  Cardiovascular: negative for chest pain, palpitations  Musculoskeletal:positive for myalgias and arthralgias  Neurological: positive for memory problems and gait problems  Behavioral/Psych: positive for anxiety,   Appetite:good   Sleep: good     Visit Vitals    /82 (BP 1 Location: Left arm, BP Patient Position: Sitting)    Pulse (!) 102    Resp 16    Ht 5' 4\" (1.626 m)    Wt 71.8 kg (158 lb 6.4 oz)    SpO2 95%    BMI 27.19 kg/m2       OBJECTIVE:                 Mental Status exam: WNL except for      Sensorium oriented to time, place and person   Relations cooperative and passive    Eye Contact    appropriate   Appearance:  age appropriate and casually dressed   Motor Behavior/Gait:  within normal limits   Speech:  normal pitch and normal volume   Thought Process: goal directed and logical   Thought Content free of delusions and free of hallucinations   Suicidal ideations none   Homicidal ideations none   Mood:  euthymic   Affect:  anxious   Memory recent  adequate   Memory remote:  adequate   Concentration:  adequate   Abstraction:  concrete   Insight:  fair   Reliability fair   Judgment:  fair       MEDICAL DECISION MAKING  Data: pertinent labs, imaging, medical records and diagnostic tests reviewed and incorporated in diagnosis and treatment plan    No Known Allergies     Current Outpatient Prescriptions   Medication Sig Dispense Refill    methylPREDNISolone (MEDROL) 4 mg tab TAKE 6 TABLETS ON DAY 1 AS DIRECTED ON PACKAGE AND DECREASE BY 1 TAB EACH DAY FOR A TOTAL OF 6 DAYS  0    venlafaxine-SR (EFFEXOR-XR) 150 mg capsule TAKE ONE CAPSULE BY MOUTH EVERY MORNING AFTER BREAKFAST 90 Cap 0    ARIPiprazole (ABILIFY) 5 mg tablet TAKE 1/2 TABLET BY MOUTH EVERY DAY, INDICATIONS: DEPRESSION TREATMENT ADJUNCT 30 Tab 3    topiramate (TOPAMAX) 100 mg tablet Take 100 mg by mouth daily. Indications: trigeminal neuralgia  5    atorvastatin (LIPITOR) 20 mg tablet Take 20 mg by mouth daily. Indications: HYPERCHOLESTEROLEMIA      aspirin delayed-release 325 mg tablet Take 81 mg by mouth daily. Indications: myocardial infarction prevention      levETIRAcetam (KEPPRA) 500 mg tablet Take 500 mg by mouth daily. Indications: atypical trigeminal neuralgia      levothyroxine (SYNTHROID) 88 mcg tablet Take 88 mcg by mouth Daily (before breakfast). Indications: HYPOTHYROIDISM      PIRFENIDONE (ESBRIET PO) Take  by mouth.  cholecalciferol (VITAMIN D3) 400 unit tab tablet Take 1,200 Units by mouth daily.  Indications: VITAMIN D DEFICIENCY      multivitamins-minerals-lutein (CENTRUM SILVER) tab tablet Take 1 Tab by mouth daily. Problems addressed today:    ICD-10-CM ICD-9-CM    1. Depression with anxiety F41.8 300.4    2. Personality disorder F60.9 301.9        Assessment:   Vivian Zayas  is a 77 y.o.  female  is not responding to treatment. Symptoms are unstable. Patient denies SI/HI/SIB. No evidence of AH/VH or delusions. Risk Scoring- chronic illnesses and prescription drug management    Treatment Plan:  1. Medications:          Medication Changes/Adjustments: Continue Effexor and Abilify in the current dosages. We decided to hold off treating her poor appetite problems till her medication issue for pulmonary fibrosis is resolved. Current Outpatient Prescriptions   Medication Sig Dispense Refill    methylPREDNISolone (MEDROL) 4 mg tab TAKE 6 TABLETS ON DAY 1 AS DIRECTED ON PACKAGE AND DECREASE BY 1 TAB EACH DAY FOR A TOTAL OF 6 DAYS  0    venlafaxine-SR (EFFEXOR-XR) 150 mg capsule TAKE ONE CAPSULE BY MOUTH EVERY MORNING AFTER BREAKFAST 90 Cap 0    ARIPiprazole (ABILIFY) 5 mg tablet TAKE 1/2 TABLET BY MOUTH EVERY DAY, INDICATIONS: DEPRESSION TREATMENT ADJUNCT 30 Tab 3    topiramate (TOPAMAX) 100 mg tablet Take 100 mg by mouth daily. Indications: trigeminal neuralgia  5    atorvastatin (LIPITOR) 20 mg tablet Take 20 mg by mouth daily. Indications: HYPERCHOLESTEROLEMIA      aspirin delayed-release 325 mg tablet Take 81 mg by mouth daily. Indications: myocardial infarction prevention      levETIRAcetam (KEPPRA) 500 mg tablet Take 500 mg by mouth daily. Indications: atypical trigeminal neuralgia      levothyroxine (SYNTHROID) 88 mcg tablet Take 88 mcg by mouth Daily (before breakfast). Indications: HYPOTHYROIDISM      PIRFENIDONE (ESBRIET PO) Take  by mouth.       cholecalciferol (VITAMIN D3) 400 unit tab tablet Take 1,200 Units by mouth daily. Indications: VITAMIN D DEFICIENCY      multivitamins-minerals-lutein (CENTRUM SILVER) tab tablet Take 1 Tab by mouth daily. The following regarding medications was addressed:    (The risks and benefits of the proposed medication; the potential medication side effects ie    dry mouth, weight gain, GI upset, headache; patient given opportunity to ask questions)       2. Counseling and coordination of care including instructions for treatment, risks/benefits, risk factor reduction and patient/family education. She/ agrees with the plan. Patient/ instructed to call with any side effects, questions or issues. 3.  Patient was provided supportive therapy for her stress of medical problems and side effects of new medicine. Short-term and long-term planning about her care was discussed.  was provided supportive therapy for his caregiver burden. PSYCHOTHERAPY:  approx 20 minutes  Type:  Supportive/Solution Focused psychotherapy provided  Focus:     Current problems :   Weight loss - poor appetite   Medical issues    Psychoeducation provided:  Psych medications. Treatment plan reviewed with patient/-including diagnosis and medications    Edinmarcosdario is not progressing.     Follow up: 2 months      Miguel Cronin MD  8/8/2018

## 2018-08-08 NOTE — PROGRESS NOTES
Jose Cruz Vergara is a 77 y.o. female    Chief Complaint   Patient presents with    Medication Management     1. Have you been to the ER, urgent care clinic since your last visit? Hospitalized since your last visit? No    2. Have you seen or consulted any other health care providers outside of the [address] [place] [address] since your last visit? Include any pap smears or colon screening.  No   Visit Vitals    /82 (BP 1 Location: Left arm, BP Patient Position: Sitting)    Pulse (!) 102    Resp 16    Ht 5' 4\" (1.626 m)    Wt 71.8 kg (158 lb 6.4 oz)    SpO2 95%    BMI 27.19 kg/m2     Ema Davis, LPN        '

## 2018-09-11 RX ORDER — ARIPIPRAZOLE 5 MG/1
TABLET ORAL
Qty: 30 TAB | Refills: 3 | Status: SHIPPED | OUTPATIENT
Start: 2018-09-11 | End: 2019-04-14 | Stop reason: SDUPTHER

## 2018-09-12 ENCOUNTER — HOSPITAL ENCOUNTER (OUTPATIENT)
Dept: GENERAL RADIOLOGY | Age: 78
Discharge: HOME | End: 2018-09-12
Payer: MEDICARE

## 2018-09-12 DIAGNOSIS — J84.10 FIBROSIS OF LUNG (HCC): ICD-10-CM

## 2018-09-12 PROCEDURE — 71046 X-RAY EXAM CHEST 2 VIEWS: CPT

## 2018-10-12 RX ORDER — VENLAFAXINE HYDROCHLORIDE 150 MG/1
CAPSULE, EXTENDED RELEASE ORAL
Qty: 90 CAP | Refills: 0 | Status: SHIPPED | OUTPATIENT
Start: 2018-10-12 | End: 2019-04-01 | Stop reason: SDUPTHER

## 2018-11-05 ENCOUNTER — OFFICE VISIT (OUTPATIENT)
Dept: BEHAVIORAL/MENTAL HEALTH CLINIC | Age: 78
End: 2018-11-05

## 2018-11-05 VITALS
SYSTOLIC BLOOD PRESSURE: 129 MMHG | HEART RATE: 120 BPM | WEIGHT: 151.8 LBS | BODY MASS INDEX: 25.92 KG/M2 | DIASTOLIC BLOOD PRESSURE: 80 MMHG | HEIGHT: 64 IN

## 2018-11-05 DIAGNOSIS — F41.8 DEPRESSION WITH ANXIETY: Primary | ICD-10-CM

## 2018-11-05 DIAGNOSIS — F60.9 PERSONALITY DISORDER (HCC): ICD-10-CM

## 2018-11-05 RX ORDER — OXYCODONE HYDROCHLORIDE 5 MG/1
5 TABLET ORAL
COMMUNITY
End: 2019-02-14

## 2018-11-05 RX ORDER — MIRTAZAPINE 15 MG/1
15 TABLET, ORALLY DISINTEGRATING ORAL
Qty: 30 TAB | Refills: 1 | Status: SHIPPED | OUTPATIENT
Start: 2018-11-05 | End: 2018-12-11 | Stop reason: SDUPTHER

## 2018-11-05 NOTE — PROGRESS NOTES
Psychiatric Outpatient Progress Note    Account Number:  [id number]  Name: Rudolph Jacobs    SUBJECTIVE:   CHIEF COMPLAINT:  Rudolph Jacobs is a 74 y.o. , white female and was seen today for 4 month follow-up of psychiatric condition and psychotropic medication management. She was brought by her  and daughter. Daughter attended the session with her.       HPI:   Rhiannon Tavera reports the following psychiatric symptoms: depression and anxiety. The symptoms have been present for years and are of mild-moderate severity. The symptoms occur infrequently.      Pt reports she broke her left hip on September 17 after a fall. She had partial hip replacement with 2 week of hospitalization followed by in home rehab, which she has completed and is walking well with the assistance of a cane. Now she will start pulmonary rehab. During the hospitalization her lung condition got worse and now she requires 24/7 oxygen. She continues to loose weight because she has no appetite. She has been continued on  ESBRIET for her pulmonary fibrosis. Family is changing her pulmonologist after her hospitalization. She is not sleeping well as she is I lot of leg pain. She takes low dose of Oxycodone few times a week with some relief. She denies any psychosis or [place]. Compliant with medications.     Weight down by 7 more lbs.  BMI =  26. BP is WNL. HR is high - asymptomatic.       Contributing factors include: medical/surgical illnesses, poor appetite      Patient denies SI/HI/SIB.        Side Effects: none       Fam/Soc Hx (from [facility] Eval with updates):         REVIEW OF SYSTEMS:  Constitutional: positive for fatigue, severe weight loss  Respiratory: positive for dyspnea on exertion  Cardiovascular: negative for chest pain, palpitations  Musculoskeletal:positive for myalgias and arthralgias  Neurological: positive for memory problems and gait problems  Behavioral/Psych: positive for anxiety,   Appetite:good   Sleep: good     Visit Vitals  /80   Pulse (!) 120   Ht 5' 4\" (1.626 m)   Wt 68.9 kg (151 lb 12.8 oz)   BMI 26.06 kg/m²       OBJECTIVE:                 Mental Status exam: WNL except for      Sensorium  oriented to time, place and person   Relations cooperative and passive    Eye Contact    appropriate   Appearance:  age appropriate, casually dressed and thin & gaunt looking   Motor Behavior/Gait:  hypoactive and gait unsteady   Speech:  normal pitch and normal volume   Thought Process: goal directed and logical   Thought Content free of delusions and free of hallucinations   Suicidal ideations none   Homicidal ideations none   Mood:  euthymic   Affect:  anxious   Memory recent  impaired   Memory remote:  adequate   Concentration:  impaired   Abstraction:  concrete   Insight:  limited   Reliability poor   Judgment:  limited       MEDICAL DECISION MAKING  Data: pertinent labs, imaging, medical records and diagnostic tests reviewed and incorporated in diagnosis and treatment plan    No Known Allergies     Current Outpatient Medications   Medication Sig Dispense Refill    oxyCODONE IR (ROXICODONE) 5 mg immediate release tablet Take 5 mg by mouth every eight (8) hours as needed for Pain.  mirtazapine (REMERON SOL-TAB) 15 mg disintegrating tablet Take 1 Tab by mouth nightly. 30 Tab 1    venlafaxine-SR (EFFEXOR-XR) 150 mg capsule TAKE ONE CAPSULE BY MOUTH EVERY MORNING AFTER BREAKFAST 90 Cap 0    ARIPiprazole (ABILIFY) 5 mg tablet TAKE 1/2 TABLET BY MOUTH EVERY DAY 30 Tab 3    PIRFENIDONE (ESBRIET PO) Take  by mouth three (3) times daily.  topiramate (TOPAMAX) 100 mg tablet Take 100 mg by mouth daily. Indications: trigeminal neuralgia  5    atorvastatin (LIPITOR) 20 mg tablet Take 20 mg by mouth daily. Indications: HYPERCHOLESTEROLEMIA      aspirin delayed-release 325 mg tablet Take 81 mg by mouth daily. Indications: myocardial infarction prevention      levETIRAcetam (KEPPRA) 500 mg tablet Take 500 mg by mouth daily. Indications: atypical trigeminal neuralgia      levothyroxine (SYNTHROID) 88 mcg tablet Take 88 mcg by mouth Daily (before breakfast). Indications: HYPOTHYROIDISM      methylPREDNISolone (MEDROL) 4 mg tab TAKE 6 TABLETS ON DAY 1 AS DIRECTED ON PACKAGE AND DECREASE BY 1 TAB EACH DAY FOR A TOTAL OF 6 DAYS  0    cholecalciferol (VITAMIN D3) 400 unit tab tablet Take 1,200 Units by mouth daily. Indications: VITAMIN D DEFICIENCY      multivitamins-minerals-lutein (CENTRUM SILVER) tab tablet Take 1 Tab by mouth daily. Problems addressed today:  Depression with Anxiety    Assessment:   Fred Avina  is a 77 y.o.  female  is not responding to treatment. Symptoms are unstable. Patient denies SI/HI/SIB. No evidence of AH/VH or delusions. Risk Scoring- chronic illnesses and prescription drug management    Treatment Plan:  1. Medications:          Medication Changes/Adjustments: start Remeron 15 mg, 1 PO at bedtime                                                              Continue Effexor and Abilify in the current dosages    Current Outpatient Medications   Medication Sig Dispense Refill    oxyCODONE IR (ROXICODONE) 5 mg immediate release tablet Take 5 mg by mouth every eight (8) hours as needed for Pain.  mirtazapine (REMERON SOL-TAB) 15 mg disintegrating tablet Take 1 Tab by mouth nightly. 30 Tab 1    venlafaxine-SR (EFFEXOR-XR) 150 mg capsule TAKE ONE CAPSULE BY MOUTH EVERY MORNING AFTER BREAKFAST 90 Cap 0    ARIPiprazole (ABILIFY) 5 mg tablet TAKE 1/2 TABLET BY MOUTH EVERY DAY 30 Tab 3    PIRFENIDONE (ESBRIET PO) Take  by mouth three (3) times daily.  topiramate (TOPAMAX) 100 mg tablet Take 100 mg by mouth daily. Indications: trigeminal neuralgia  5    atorvastatin (LIPITOR) 20 mg tablet Take 20 mg by mouth daily. Indications: HYPERCHOLESTEROLEMIA      aspirin delayed-release 325 mg tablet Take 81 mg by mouth daily.  Indications: myocardial infarction prevention      levETIRAcetam (KEPPRA) 500 mg tablet Take 500 mg by mouth daily. Indications: atypical trigeminal neuralgia      levothyroxine (SYNTHROID) 88 mcg tablet Take 88 mcg by mouth Daily (before breakfast). Indications: HYPOTHYROIDISM      methylPREDNISolone (MEDROL) 4 mg tab TAKE 6 TABLETS ON DAY 1 AS DIRECTED ON PACKAGE AND DECREASE BY 1 TAB EACH DAY FOR A TOTAL OF 6 DAYS  0    cholecalciferol (VITAMIN D3) 400 unit tab tablet Take 1,200 Units by mouth daily. Indications: VITAMIN D DEFICIENCY      multivitamins-minerals-lutein (CENTRUM SILVER) tab tablet Take 1 Tab by mouth daily. The following regarding medications was addressed:    (The risks and benefits of the proposed medication; the potential medication side effects ie    dry mouth, weight gain, GI upset, headache; patient given opportunity to ask questions)       2. Counseling and coordination of care including instructions for treatment, risks/benefits, risk factor reduction and patient/family education. She/daughter agrees with the plan. Patient/daughter instructed to call with any side effects, questions or issues. 3.  Patient was provided supportive therapy for her stress of medical problems and recent hip fracture. Daughter was provided supportive therapy for his caregiver burden. PSYCHOTHERAPY:  approx 20 minutes  Type:  Supportive/Cognitive Behavioral/Solution Focused psychotherapy provided  Focus:     Current problems:   Medical issues   Depression    Psychoeducation provided: psych medications    Treatment plan reviewed with patient/daughter-including diagnosis and medications    Khushbu Solano is declining physically    Follow-up Disposition:  Return in about 4 weeks (around 12/3/2018).       Rita Chance MD  11/5/2018

## 2018-12-11 ENCOUNTER — OFFICE VISIT (OUTPATIENT)
Dept: BEHAVIORAL/MENTAL HEALTH CLINIC | Age: 78
End: 2018-12-11

## 2018-12-11 VITALS
HEIGHT: 64 IN | HEART RATE: 110 BPM | WEIGHT: 151 LBS | DIASTOLIC BLOOD PRESSURE: 76 MMHG | BODY MASS INDEX: 25.78 KG/M2 | SYSTOLIC BLOOD PRESSURE: 121 MMHG

## 2018-12-11 DIAGNOSIS — F60.9 PERSONALITY DISORDER (HCC): ICD-10-CM

## 2018-12-11 DIAGNOSIS — F41.8 DEPRESSION WITH ANXIETY: Primary | ICD-10-CM

## 2018-12-11 RX ORDER — MIRTAZAPINE 15 MG/1
15 TABLET, ORALLY DISINTEGRATING ORAL
Qty: 30 TAB | Refills: 2 | Status: SHIPPED | OUTPATIENT
Start: 2018-12-11 | End: 2019-04-13 | Stop reason: SDUPTHER

## 2018-12-12 NOTE — PROGRESS NOTES
Psychiatric Outpatient Progress Note    Account Number:  [id number]  Name: Roshan Chappell    SUBJECTIVE:   CHIEF COMPLAINT:  Roshan Chappell is a 74 y.o. , white female and was seen today for 4 month follow-up of psychiatric condition and psychotropic medication management. She was brought by her  and daughter. Daughter attended the session with her. She ambulated with the assistance of a cane. She is carrying her portable oxygen tank and receiving oxygen via [place].       HPI:   Francheskajatin Talbert reports the following psychiatric symptoms: depression and anxiety. The symptoms have been present for years and are of mild-moderate severity. The symptoms occur infrequently.      Pt reports that she is doing better except some episodes of nose bleed for past few days. Her nasal mucosa stays dry due to 24/7 oxygen. She is eating better and her weight has stabilized. She is sleeping much better. She is tolerating addition of Remeron pretty well. She denies any psychosis or [place]. Compliant with medications.     Weight is stable. BMI =  26.  BP -WNL. HR is high - asymptomatic. She is walking some at home. Medically stable. She is frail.  is her primary care provider.       Contributing factors include: medical/surgical illnesses, polypharmacy, 24/7 oxygen     Patient denies SI/HI/SIB.        Side Effects: none       Fam/Soc Hx (from [facility] Eval with updates):         REVIEW OF SYSTEMS:  Constitutional: positive for fatigue  Respiratory: positive for dyspnea on exertion  Cardiovascular: negative for chest pain, palpitations  Musculoskeletal:positive for myalgias and arthralgias  Neurological: positive for memory problems and gait problems  Behavioral/Psych: positive for anxiety,   Appetite:good   Sleep: good    Visit Vitals  /76   Pulse (!) 110   Ht 5' 4\" (1.626 m)   Wt 68.5 kg (151 lb)   BMI 25.92 kg/m²       OBJECTIVE:                 Mental Status exam: WNL except for      Sensorium  oriented to time, place and person   Relations cooperative and passive    Eye Contact    appropriate   Appearance:  age appropriate and casually dressed   Motor Behavior/Gait:  hypoactive and gait unsteady   Speech:  normal pitch and normal volume   Thought Process: goal directed and logical   Thought Content free of delusions and free of hallucinations   Suicidal ideations none   Homicidal ideations none   Mood:  euthymic   Affect:  anxious   Memory recent  adequate   Memory remote:  adequate   Concentration:  adequate   Abstraction:  concrete   Insight:  fair   Reliability fair   Judgment:  fair       MEDICAL DECISION MAKING  Data: pertinent labs, imaging, medical records and diagnostic tests reviewed and incorporated in diagnosis and treatment plan    No Known Allergies     Current Outpatient Medications   Medication Sig Dispense Refill    mirtazapine (REMERON SOL-TAB) 15 mg disintegrating tablet Take 1 Tab by mouth nightly. 30 Tab 2    oxyCODONE IR (ROXICODONE) 5 mg immediate release tablet Take 5 mg by mouth every eight (8) hours as needed for Pain.  venlafaxine-SR (EFFEXOR-XR) 150 mg capsule TAKE ONE CAPSULE BY MOUTH EVERY MORNING AFTER BREAKFAST 90 Cap 0    ARIPiprazole (ABILIFY) 5 mg tablet TAKE 1/2 TABLET BY MOUTH EVERY DAY 30 Tab 3    PIRFENIDONE (ESBRIET PO) Take  by mouth three (3) times daily.  topiramate (TOPAMAX) 100 mg tablet Take 100 mg by mouth daily. Indications: trigeminal neuralgia  5    atorvastatin (LIPITOR) 20 mg tablet Take 20 mg by mouth daily. Indications: HYPERCHOLESTEROLEMIA      aspirin delayed-release 325 mg tablet Take 81 mg by mouth daily. Indications: myocardial infarction prevention      levETIRAcetam (KEPPRA) 500 mg tablet Take 500 mg by mouth daily. Indications: atypical trigeminal neuralgia      levothyroxine (SYNTHROID) 88 mcg tablet Take 88 mcg by mouth Daily (before breakfast). Indications: HYPOTHYROIDISM            Problems addressed today:    ICD-10-CM ICD-9-CM    1.  Depression with anxiety F41.8 300.4    2. Personality disorder ([facility] [address] F60.9 301.9        Assessment:   Jaymie Baugh  is a 77 y.o.  female  is responding to treatment. Symptoms are improving. Patient denies SI/HI/SIB. No evidence of AH/VH or delusions. Risk Scoring- chronic illnesses and prescription drug management    Treatment Plan:  1. Medications:          Medication Changes/Adjustments: continue combination of Remeron, Effexor and Abilify in the current dosages. Current Outpatient Medications   Medication Sig Dispense Refill    mirtazapine (REMERON SOL-TAB) 15 mg disintegrating tablet Take 1 Tab by mouth nightly. 30 Tab 2    oxyCODONE IR (ROXICODONE) 5 mg immediate release tablet Take 5 mg by mouth every eight (8) hours as needed for Pain.  venlafaxine-SR (EFFEXOR-XR) 150 mg capsule TAKE ONE CAPSULE BY MOUTH EVERY MORNING AFTER BREAKFAST 90 Cap 0    ARIPiprazole (ABILIFY) 5 mg tablet TAKE 1/2 TABLET BY MOUTH EVERY DAY 30 Tab 3    PIRFENIDONE (ESBRIET PO) Take  by mouth three (3) times daily.  topiramate (TOPAMAX) 100 mg tablet Take 100 mg by mouth daily. Indications: trigeminal neuralgia  5    atorvastatin (LIPITOR) 20 mg tablet Take 20 mg by mouth daily. Indications: HYPERCHOLESTEROLEMIA      aspirin delayed-release 325 mg tablet Take 81 mg by mouth daily. Indications: myocardial infarction prevention      levETIRAcetam (KEPPRA) 500 mg tablet Take 500 mg by mouth daily. Indications: atypical trigeminal neuralgia      levothyroxine (SYNTHROID) 88 mcg tablet Take 88 mcg by mouth Daily (before breakfast). Indications: HYPOTHYROIDISM                    The following regarding medications was addressed:    (The risks and benefits of the proposed medication; the potential medication side effects ie    dry mouth, weight gain, GI upset, headache; patient given opportunity to ask questions)       2.   Counseling and coordination of care including instructions for treatment, risks/benefits, risk factor reduction and patient/family education. She/Family agrees with the plan. Patient/Family instructed to call with any side effects, questions or issues. 3.  Patient was provided supportive therapy for her stress of medical problems and polypharmacy. Daughter/ were provided supportive therapy for their caregiver burden    PSYCHOTHERAPY:  approx 20 minutes  Type:  Supportive/Solution Focused psychotherapy provided  Focus:     Current problems:   Medical issues    Psychoeducation provided:  Psych medications    Treatment plan reviewed with patient/family-including diagnosis and medications    Matt Watson is slowly progressing. Follow-up Disposition:  Return in about 2 months (around 2/11/2019).       Reva Klein MD  12/12/2018

## 2019-01-29 ENCOUNTER — OFFICE VISIT (OUTPATIENT)
Dept: BEHAVIORAL/MENTAL HEALTH CLINIC | Age: 79
End: 2019-01-29

## 2019-01-29 DIAGNOSIS — F41.8 DEPRESSION WITH ANXIETY: Primary | ICD-10-CM

## 2019-01-29 NOTE — PROGRESS NOTES
Outpatient Initial Assessment and Psychotherapy Note     Chief Complaint:     Chief Complaint   Patient presents with    Depression         History of Present Illness: Tray Pavon is a 77 y.o. female who presents with symptoms of depression and anxiety. She is presently being treated by Dr. Abraham Mirza MD, who referred her for individual psychotherapy. Client reports having treatment for many years for anxiety and depression. She reports compliance with medications and this has been beneficial. A year ago, she was diagnosed with Pulmonary Fibrosis and is now on 24 hours oxygen. She states her diagnosis and limitations (I.e. Does not like to go out or go to [employer] much due to being on oxygen machine) has affected her mood. In addition, she was initially given a prognosis from first doctor of having 2 1/2 years and this was one year ago. This weighs heavily on her. She has adult children, who are now pushing for she and  to move from their home and go into a jail facility and this is also causing her distress and has affected her relationship with her daughter, specifically. She states she wants to work on these issues in individual therapy and to learn better coping strategies. Significant Social History:  Client was born and raised in [place]. She had 11 siblings and she is the next to the youngest child. She had 5 sisters and 5 brothers, but all are  except for 2 of her brothers. She reports most  from cancer or heart problems. Mother was homemaker and father was deputy and owned a small local grocery store. Client reports an unremarkable childhood, free of trauma/abuse. Client met her  when they attended [facility] in [facility]. They have been  for 60 years. He was a CPA and she worked as his . He is now retired and is her caretaker. In addition to her diagnosis of Pulmonary Fibrosis, she reports she fell in September and broke her hip.  She is now fully healed, but  was instrumental in her care. Client and  have 2 children. A son, who lives in [place] and a daughter who is a nurse and lives locally. Client states children want them to sell their home and move to [facility] community. Client is all for downsizing home, but does not want to go to expensive [facility] community when she feels she and  are capable of caring for themselves. She is also reluctant to \"spend all the money we worked hard to give to our children as their inheritance. \" This issues has caused a rift between client and daughter and they have barely spoken the last month. This has been very upsetting for client. Substance Abuse History:    Client admits that she and  have \"one cocktail\" every night, but does not see it as problematic. No history of any drug use. Current  Medication List:   Current Outpatient Medications   Medication Sig Dispense Refill    mirtazapine (REMERON SOL-TAB) 15 mg disintegrating tablet Take 1 Tab by mouth nightly. 30 Tab 2    oxyCODONE IR (ROXICODONE) 5 mg immediate release tablet Take 5 mg by mouth every eight (8) hours as needed for Pain.  venlafaxine-SR (EFFEXOR-XR) 150 mg capsule TAKE ONE CAPSULE BY MOUTH EVERY MORNING AFTER BREAKFAST 90 Cap 0    ARIPiprazole (ABILIFY) 5 mg tablet TAKE 1/2 TABLET BY MOUTH EVERY DAY 30 Tab 3    PIRFENIDONE (ESBRIET PO) Take  by mouth three (3) times daily.  topiramate (TOPAMAX) 100 mg tablet Take 100 mg by mouth daily. Indications: trigeminal neuralgia  5    atorvastatin (LIPITOR) 20 mg tablet Take 20 mg by mouth daily. Indications: HYPERCHOLESTEROLEMIA      aspirin delayed-release 325 mg tablet Take 81 mg by mouth daily. Indications: myocardial infarction prevention      levETIRAcetam (KEPPRA) 500 mg tablet Take 500 mg by mouth daily.  Indications: atypical trigeminal neuralgia      levothyroxine (SYNTHROID) 88 mcg tablet Take 88 mcg by mouth Daily (before breakfast). Indications: HYPOTHYROIDISM            Family Psychiatric History:   History reviewed. No pertinent family history. Family medical problems:  History reviewed. No pertinent family history. Social History:      Social History     Socioeconomic History    Marital status:      Spouse name: Not on file    Number of children: Not on file    Years of education: Not on file    Highest education level: Not on file   Social Needs    Financial resource strain: Not on file    Food insecurity - worry: Not on file    Food insecurity - inability: Not on file   [place] needs - medical: Not on file   [place] needs - non-medical: Not on file   Occupational History    Not on file   Tobacco Use    Smoking status: Former Smoker    Smokeless tobacco: Never Used   Substance and Sexual Activity    Alcohol use:  Yes     Alcohol/week: 1.2 oz     Types: 2 Shots of liquor per week     Comment: every evening liquor x years    Drug use: No    Sexual activity: Not on file   Other Topics Concern    Not on file   Social History Narrative    Not on file       Allergies:   No Known Allergies       Psychiatric/Mental Status Examination:     MENTAL STATUS EXAM:  Sensorium  oriented to time, place and person   Orientation person, place, time/date, situation, day of week, month of year and year   Relations cooperative   Eye Contact appropriate   Appearance:  age appropriate and casually dressed   Motor Behavior:  within normal limits   Speech:  normal pitch and normal volume   Vocabulary average   Thought Process: goal directed and logical   Thought Content free of delusions and free of hallucinations   Suicidal ideations no plan , no intention and contracts for safety   Homicidal ideations no plan , no intention and contracts for safety   Mood:  anxious   Affect:  anxious   Memory recent  adequate   Memory remote:  adequate   Concentration:  adequate   Abstraction:  abstract   Insight:  fair Reliability fair   Judgment:  fair   Diagnoses:   Axis I: Depression with anxiety  Axis II: Deferred  Axis III:   Past Medical History:   Diagnosis Date    Depression     Hypercholesterolemia     Thyroid disease      Axis IV: Problems with primary support group, Problems related to social environment, Problems with access to health care services and Other psychosocial or environmental problems  Axis V:61-70 mild symptoms      Clinical Impressions:  Jamila Ozuna is a 77 y.o. female who presents with symptoms of depression and anxiety. She is presently being treated by Dr. Tariq Ruano MD, who referred her for individual psychotherapy. Client reports having treatment for many years for anxiety and depression. She reports compliance with medications and this has been beneficial. A year ago, she was diagnosed with Pulmonary Fibrosis and is now on 24 hours oxygen. She states her diagnosis and limitations (I.e. Does not like to go out or go to [employer] much due to being on oxygen machine) has affected her mood. In addition, she was initially given a prognosis from first doctor of having 2 1/2 years and this was one year ago. This weighs heavily on her. She has adult children, who are now pushing for she and  to move from their home and go into a halfway facility and this is also causing her distress and has affected her relationship with her daughter, specifically. She states she wants to work on these issues in individual therapy and to learn better coping strategies. She is amenable to having family sessions, too, to help improve overall communication. Will see her for individual psychotherapy utilizing CBT approach. The nature and course of the patient's psychiatric diagnosis were discussed with the patient. Office and confidentiality policies and procedures were discussed with patient. The patient has my contact information for routine or urgent concerns.       Tony White, LCSW  1/29/2019

## 2019-02-14 ENCOUNTER — OFFICE VISIT (OUTPATIENT)
Dept: BEHAVIORAL/MENTAL HEALTH CLINIC | Age: 79
End: 2019-02-14

## 2019-02-14 VITALS
HEIGHT: 64 IN | BODY MASS INDEX: 25.27 KG/M2 | SYSTOLIC BLOOD PRESSURE: 139 MMHG | WEIGHT: 148 LBS | DIASTOLIC BLOOD PRESSURE: 80 MMHG | HEART RATE: 80 BPM

## 2019-02-14 DIAGNOSIS — F41.8 DEPRESSION WITH ANXIETY: Primary | ICD-10-CM

## 2019-02-14 DIAGNOSIS — F60.9 PERSONALITY DISORDER (HCC): ICD-10-CM

## 2019-02-15 NOTE — PROGRESS NOTES
Psychiatric Outpatient Progress Note    Account Number:  [id number]  Name: Samreen Brown    SUBJECTIVE:   CHIEF COMPLAINT:  Samreen Brown is a [de-identified]. o. , white female and was seen today for 2 month follow-up of psychiatric condition and psychotropic medication management. She was brought by her  and daughter. Daughter attended the session with her. She is carrying her portable oxygen tank and receiving oxygen via [place].       HPI:   Brett reports the following psychiatric symptoms: depression and anxiety. The symptoms have been present for years and are of mild-moderate severity. The symptoms occur infrequently.      Pt reports that she is doing well. Her nasal mucosa stays dry due to 24/7 oxygen. She is eating better and weight has gone down. She is sleeping much better. She denies any psychosis or [place]. Compliant with medications.     Weight is down by 3 lbs. . BMI =  26.  BP/HR is WNL. She is walking some at home. Medically stable. She is frail.  is her primary care provider. Today, /daughter wanted to discuss long term planning for living places.       Contributing factors include: medical/surgical illnesses, polypharmacy, 24/7 oxygen     Patient denies SI/HI/SIB.     Side Effects: none       Fam/Soc Hx (from [facility] with updates):    Ethnic:   Relationship Status:  x years. One on and one daughter. 4 grandchildren. Living Situation: With spouse   Employment: retired . 2 years of college.    Tobacco/Caffeine/Alchol use: daily 2-3  shots of Vodka   Illicit Drug Social History:  no history of illicit drug use  Hobbies:  TV  Sexual:  heterosexual      REVIEW OF SYSTEMS:  Constitutional: positive for fatigue  Respiratory: positive for dyspnea on exertion  Cardiovascular: negative for chest pain, palpitations  Musculoskeletal:positive for myalgias and arthralgias  Neurological: positive for memory problems and gait problems  Behavioral/Psych: positive for anxiety,   Appetite:good   Sleep: good    SCALES:(3/3/16)  MMSE; 28/30  GDS: 6/15    Visit Vitals  /80   Pulse 80   Ht 5' 4\" (1.626 m)   Wt 67.1 kg (148 lb)   BMI 25.40 kg/m²       OBJECTIVE:                 Mental Status exam: WNL except for      Sensorium  oriented to time, place and person   Relations cooperative and passive    Eye Contact    appropriate   Appearance:  age appropriate and casually dressed   Motor Behavior/Gait:  within normal limits   Speech:  normal pitch and normal volume   Thought Process: goal directed and logical   Thought Content free of delusions and free of hallucinations   Suicidal ideations none   Homicidal ideations none   Mood:  euthymic   Affect:  full range   Memory recent  adequate   Memory remote:  adequate   Concentration:  adequate   Abstraction:  concrete   Insight:  fair   Reliability fair   Judgment:  fair       MEDICAL DECISION MAKING  Data: pertinent labs, imaging, medical records and diagnostic tests reviewed and incorporated in diagnosis and treatment plan    No Known Allergies     Current Outpatient Medications   Medication Sig Dispense Refill    mirtazapine (REMERON SOL-TAB) 15 mg disintegrating tablet Take 1 Tab by mouth nightly. 30 Tab 2    venlafaxine-SR (EFFEXOR-XR) 150 mg capsule TAKE ONE CAPSULE BY MOUTH EVERY MORNING AFTER BREAKFAST 90 Cap 0    ARIPiprazole (ABILIFY) 5 mg tablet TAKE 1/2 TABLET BY MOUTH EVERY DAY 30 Tab 3    PIRFENIDONE (ESBRIET PO) Take  by mouth three (3) times daily.  topiramate (TOPAMAX) 100 mg tablet Take 100 mg by mouth daily. Indications: trigeminal neuralgia  5    atorvastatin (LIPITOR) 20 mg tablet Take 20 mg by mouth daily. Indications: HYPERCHOLESTEROLEMIA      aspirin delayed-release 325 mg tablet Take 81 mg by mouth daily. Indications: myocardial infarction prevention      levETIRAcetam (KEPPRA) 500 mg tablet Take 500 mg by mouth daily.  Indications: atypical trigeminal neuralgia      levothyroxine (SYNTHROID) 88 mcg tablet Take 88 mcg by mouth Daily (before breakfast). Indications: HYPOTHYROIDISM            Problems addressed today:    ICD-10-CM ICD-9-CM    1. Depression with anxiety F41.8 300.4    2. Personality disorder ([address] F60.9 301.9        Assessment:   Loy Son  is a [de-identified] y.o.  female  is responding to treatment. Symptoms are stable. Patient denies SI/HI/SIB. No evidence of AH/VH or delusions. Risk Scoring- chronic illnesses and prescription drug management    Treatment Plan:  1. Medications:          Medication Changes/Adjustments: continue combination of Remeron, Effexor and Abilify in the current dosages. Current Outpatient Medications   Medication Sig Dispense Refill    mirtazapine (REMERON SOL-TAB) 15 mg disintegrating tablet Take 1 Tab by mouth nightly. 30 Tab 2    venlafaxine-SR (EFFEXOR-XR) 150 mg capsule TAKE ONE CAPSULE BY MOUTH EVERY MORNING AFTER BREAKFAST 90 Cap 0    ARIPiprazole (ABILIFY) 5 mg tablet TAKE 1/2 TABLET BY MOUTH EVERY DAY 30 Tab 3    PIRFENIDONE (ESBRIET PO) Take  by mouth three (3) times daily.  topiramate (TOPAMAX) 100 mg tablet Take 100 mg by mouth daily. Indications: trigeminal neuralgia  5    atorvastatin (LIPITOR) 20 mg tablet Take 20 mg by mouth daily. Indications: HYPERCHOLESTEROLEMIA      aspirin delayed-release 325 mg tablet Take 81 mg by mouth daily. Indications: myocardial infarction prevention      levETIRAcetam (KEPPRA) 500 mg tablet Take 500 mg by mouth daily. Indications: atypical trigeminal neuralgia      levothyroxine (SYNTHROID) 88 mcg tablet Take 88 mcg by mouth Daily (before breakfast). Indications: HYPOTHYROIDISM                    The following regarding medications was addressed:    (The risks and benefits of the proposed medication; the potential medication side effects ie    dry mouth, weight gain, GI upset, headache; patient given opportunity to ask questions)       2.   Counseling and coordination of care including instructions for treatment, risks/benefits, risk factor reduction and patient/family education. She /familyagrees with the plan. Patient/family instructed to call with any side effects, questions or issues. 3.  Pt and family were educated on the pros and cons of independent living, CAROLINE, Long term continuity of living models of living etc. Services provided in each model of living discussed. They were strongly recommended to keep their financial resources in mind , in consultation with  before making a decision. Family appreciated my education. PSYCHOTHERAPY:  approx 20 minutes  Type:  Supportive/Cognitive Behavioral/Solution Focused psychotherapy provided  Focus:     Current problems:   Housing issues   Medical issues    Psychoeducation provided:  Psych medications. Treatment plan reviewed with patient/family-including diagnosis and medications    Author Media is stable. Follow-up Disposition:  Return in about 4 months (around 6/14/2019).       Conor Jeong MD  2/15/2019

## 2019-02-27 ENCOUNTER — OFFICE VISIT (OUTPATIENT)
Dept: BEHAVIORAL/MENTAL HEALTH CLINIC | Age: 79
End: 2019-02-27

## 2019-02-27 DIAGNOSIS — F41.8 DEPRESSION WITH ANXIETY: Primary | ICD-10-CM

## 2019-02-27 NOTE — PROGRESS NOTES
PSYCHOTHERAPY NOTE      Alpha Cousin is a [de-identified] y.o. female who presents with anxiety/depression. Client was seen for an individual psychotherapy session that lasted for 50 minutes. Progress:  Client presents with an improved mood and affect. Her , Livia Main, accompanied her to session today and provided collateral information. Client shared that she saw treating provider since our last session. At that meeting, her daughter was present. Client states that treating provider supported client being as independent as possible and that helped daughter to stop her demands that they go into pen[facility]iary home. Since then, client and daughter have seen each other a few times and this has been good meetings. Client is hopeful that they can rebuild their relationship. Son-in-law has been less receptive and we explored ways to help improve their relationship. Reminded client of what is and what is not within her control. Both client and  are looking are downsizing home and are exploring low maintenance/one floor patio homes. Mood is improved.      Mental Status exam:         Sensorium  oriented to time, place and person   Relations cooperative   Appearance:  age appropriate and casually dressed   Motor Behavior:  within normal limits   Speech:  normal pitch and normal volume   Thought Process: goal directed   Thought Content free of delusions and free of hallucinations   Suicidal ideations no plan  and no intention   Homicidal ideations no plan  and no intention   Mood:  euthymic   Affect:  mood-congruent   Memory recent  adequate   Memory remote:  adequate   Concentration:  adequate   Abstraction:  abstract   Insight:  fair   Reliability fair   Judgment:  fair         DIAGNOSIS AND IMPRESSION:  Axis I: Depression with anxiety  Axis II: Deferred      Axis III:   Past Medical History:   Diagnosis Date    Depression     Hypercholesterolemia     Thyroid disease      Axis IV: Problems with primary support group, Problems related to social environment, Problems with access to health care services and Other psychosocial or environmental problems  Axis V:  61-70 mild symptoms    Interventions/plans: Follow up in 2 weeks.

## 2019-03-08 ENCOUNTER — OFFICE VISIT (OUTPATIENT)
Dept: BEHAVIORAL/MENTAL HEALTH CLINIC | Age: 79
End: 2019-03-08

## 2019-03-08 DIAGNOSIS — F41.8 DEPRESSION WITH ANXIETY: Primary | ICD-10-CM

## 2019-03-08 NOTE — PROGRESS NOTES
PSYCHOTHERAPY NOTE      Beata Chambers is a [de-identified] y.o. female who presents with depression. Client was seen for an individual psychotherapy session that lasted for 50 minutes. Progress:  Client presents with an improved mood and affect. She shared that her daughter and son-in-law came to home recently and while there was some awkwardness initially, it ended up being a good day. Client admits to historically being \"stubborn\" and making a more concerted effort to work on that and to be more receptive/flexible with family situations. Her communication with daughter is also improving. Client has been more socially isolative with breathing apparatus. She use to enjoy going to concerts, plays and to [employer] and has not done this since having to have breathing machine. Encouraged client to try to get out more and to not allow this to limit her. Suggested she start with returning to ladies group at [employer] and to also attend local support group for individuals with similar medical diagnosis. Client amenable.      Mental Status exam:         Sensorium  oriented to time, place and person   Relations cooperative   Appearance:  age appropriate and casually dressed   Motor Behavior:  within normal limits   Speech:  normal pitch and normal volume   Thought Process: goal directed and logical   Thought Content free of delusions and free of hallucinations   Suicidal ideations none   Homicidal ideations none   Mood:  euthymic   Affect:  mood-congruent   Memory recent  adequate   Memory remote:  adequate   Concentration:  adequate   Abstraction:  abstract   Insight:  fair   Reliability fair   Judgment:  fair         DIAGNOSIS AND IMPRESSION:    Axis I: Depression with anxiety  Axis II: Deferred  Axis III:   Past Medical History:   Diagnosis Date    Depression     Hypercholesterolemia     Thyroid disease      Axis IV: Problems with primary support group, Problems related to social environment, Problems with access to health care services and Other psychosocial or environmental problems  Axis V:  61-70 mild symptoms    Interventions/plans: Follow up in 2 weeks.

## 2019-03-22 ENCOUNTER — OFFICE VISIT (OUTPATIENT)
Dept: BEHAVIORAL/MENTAL HEALTH CLINIC | Age: 79
End: 2019-03-22

## 2019-03-22 DIAGNOSIS — F41.8 DEPRESSION WITH ANXIETY: Primary | ICD-10-CM

## 2019-03-22 NOTE — PROGRESS NOTES
PSYCHOTHERAPY NOTE      Mac Gonzales is a [de-identified] y.o. female who presents with depression/anxiety. Client was seen for an individual psychotherapy session that lasted for 50 minutes. Progress:  Client demonstrating improvement. She is less depressed and less anxious. She and daughter have begun to mend their relationship. Client is excited that they are getting along better. Client states she has had a lot of appointments lately and they have been very busy. This has resulted in her feeling more fatigued, but she has seen improvements especially with mobility from the rehabilitation. Client shared how her fears re: her diagnosis has immobilized her. She is less social, she is reluctant to plan trips, she is reluctant to purchase new clothes (although her clothes do not fit well as she has lost 40lbs over the time of her diagnosis). Discussed need for client to just \"live\" her life and not dwell on diagnosis or prognosis. She once was told that she only had a few years to live and that was a year ago, and she tends to dwell on that. We processed this in session and explored ways for client to be more present focused.      Mental Status exam:         Sensorium  oriented to time, place and person   Relations cooperative   Appearance:  age appropriate and casually dressed   Motor Behavior:  within normal limits   Speech:  normal pitch and normal volume   Thought Process: goal directed and logical   Thought Content free of delusions and free of hallucinations   Suicidal ideations no plan  and no intention   Homicidal ideations no plan  and no intention   Mood:  euthymic   Affect:  mood-congruent   Memory recent  adequate   Memory remote:  adequate   Concentration:  adequate   Abstraction:  abstract   Insight:  fair   Reliability fair   Judgment:  fair         DIAGNOSIS AND IMPRESSION:      Axis I: Depression with anxiety  Axis II: No diagnosis    Axis III:   Past Medical History:   Diagnosis Date    Depression  Hypercholesterolemia     Thyroid disease      Axis IV: Problems with primary support group, Problems related to social environment, Problems with access to health care services and Other psychosocial or environmental problems  Axis V:  61-70 mild symptoms    Interventions/plans:   Follow up in 2 weeks

## 2019-04-01 RX ORDER — VENLAFAXINE HYDROCHLORIDE 150 MG/1
CAPSULE, EXTENDED RELEASE ORAL
Qty: 90 CAP | Refills: 0 | Status: SHIPPED | OUTPATIENT
Start: 2019-04-01 | End: 2019-06-18 | Stop reason: SDUPTHER

## 2019-04-14 RX ORDER — MIRTAZAPINE 15 MG/1
TABLET, ORALLY DISINTEGRATING ORAL
Qty: 30 TAB | Refills: 2 | Status: SHIPPED | OUTPATIENT
Start: 2019-04-14 | End: 2019-06-18 | Stop reason: SDUPTHER

## 2019-04-15 RX ORDER — ARIPIPRAZOLE 5 MG/1
TABLET ORAL
Qty: 30 TAB | Refills: 3 | Status: SHIPPED | OUTPATIENT
Start: 2019-04-15 | End: 2019-06-18 | Stop reason: SDUPTHER

## 2019-04-17 ENCOUNTER — OFFICE VISIT (OUTPATIENT)
Dept: BEHAVIORAL/MENTAL HEALTH CLINIC | Age: 79
End: 2019-04-17

## 2019-04-17 DIAGNOSIS — F41.8 DEPRESSION WITH ANXIETY: Primary | ICD-10-CM

## 2019-04-17 NOTE — PROGRESS NOTES
PSYCHOTHERAPY NOTE      Tobi Osei is a [de-identified] y.o. female who presents with depression/anxiety. Client was seen for an individual psychotherapy session that lasted for 50 minutes. Progress:  Client presents with an improved mood and affect. Is less depressed and less anxious. Does feel fatigued due to illness and having to go to several appointments and rehabilitation, but overall mood is good. She is less negative with her thoughts re: illness and making a more concerted to remain positive. While she has not yet gone back to her activities, she is hoping to do so and we explored today what her self imposed barriers have been that have prevented her and ways to overcome them. Relationship with her daughter has improved and she is glad for this improvement. She is overwhelmed by \"clutter\" in the home and need to downsize. Suggested she only take 1-2 hours a day to work on this and to balance with other enjoyable activities (going to [address], etc). Client shared that she wants to talk with her brother re: her health as he is unaware. They have been fairly estranged, but she feels he needs to be informed. Explored ways to promote communication between them.     Mental Status exam:         Sensorium  oriented to time, place and person   Relations cooperative   Appearance:  age appropriate and casually dressed   Motor Behavior:  within normal limits   Speech:  normal pitch and normal volume   Thought Process: goal directed and logical   Thought Content free of delusions and free of hallucinations   Suicidal ideations no plan  and no intention   Homicidal ideations no plan  and no intention   Mood:  euthymic   Affect:  mood-congruent   Memory recent  adequate   Memory remote:  adequate   Concentration:  adequate   Abstraction:  abstract   Insight:  fair   Reliability fair   Judgment:  fair         DIAGNOSIS AND IMPRESSION:    Axis I: Depression with anxiety  Axis II: No diagnosis      Axis III:   Past Medical History:   Diagnosis Date    Depression     Hypercholesterolemia     Thyroid disease      Axis IV: Problems with primary support group, Problems related to social environment, Problems with access to health care services and Other psychosocial or environmental problems  Axis V:  61-70 mild symptoms    Interventions/plans: Follow up in 2-4 weeks.

## 2019-05-29 ENCOUNTER — OFFICE VISIT (OUTPATIENT)
Dept: BEHAVIORAL/MENTAL HEALTH CLINIC | Age: 79
End: 2019-05-29

## 2019-05-29 DIAGNOSIS — F41.8 DEPRESSION WITH ANXIETY: Primary | ICD-10-CM

## 2019-05-29 NOTE — PROGRESS NOTES
PSYCHOTHERAPY NOTE      Ngoc Mcqueen is a [de-identified] y.o. female who presents with depression/anxiety. Client was seen for an individual psychotherapy session that lasted for 50 minutes. Progress:  Client presents with an improved mood and affect. Admits that she has not gone to river or to [employer], but hopes to try to do those things soon. She is very hesitant to go due to having to be on oxygen and feel that the noise it makes may be distraction to others. Discussed ways to minimize this and to try going to women's group at [employer] first instead of [employer] [facility] (as it will be smaller). Client shared that her feelings were hurt by her daughter, who did not invite her to beach. Client cannot really go to beach/outside much, but would have enjoyed seeing her grandson who lives in [place]. We processed this in session and reminded client of the things daughter has done for her. Client is able to put herself in daughter's shoes and this allowed her to have another perspective to help her get rid of this anger/hurt. Client is going to [facility] soon and is excited to have her high school reunion. She has also reached out to brother to tell him about her health. She was very anxious about it to begin with, but their conversation went very well and she feels good about it overall. Is demonstrating improvement.      Mental Status exam:         Sensorium  oriented to time, place and person   Relations cooperative   Appearance:  age appropriate and casually dressed   Motor Behavior:  within normal limits   Speech:  normal pitch and normal volume   Thought Process: goal directed and logical   Thought Content free of delusions and free of hallucinations   Suicidal ideations none   Homicidal ideations none   Mood:  euthymic   Affect:  euthymic   Memory recent  adequate   Memory remote:  adequate   Concentration:  adequate   Abstraction:  abstract   Insight:  fair   Reliability fair   Judgment:  fair         DIAGNOSIS AND IMPRESSION:    Axis I: Depression with anxiety  Axis II: No diagnosis  Axis III:   Past Medical History:   Diagnosis Date    Depression     Hypercholesterolemia     Thyroid disease      Axis IV: Problems with primary support group, Problems related to social environment, Problems with access to health care services and Other psychosocial or environmental problems  Axis V:  61-70 mild symptoms    Interventions/plans: Follow up in 2-4 weeks.

## 2019-06-18 ENCOUNTER — OFFICE VISIT (OUTPATIENT)
Dept: BEHAVIORAL/MENTAL HEALTH CLINIC | Age: 79
End: 2019-06-18

## 2019-06-18 VITALS
HEIGHT: 64 IN | HEART RATE: 98 BPM | SYSTOLIC BLOOD PRESSURE: 135 MMHG | BODY MASS INDEX: 24.24 KG/M2 | WEIGHT: 142 LBS | DIASTOLIC BLOOD PRESSURE: 85 MMHG

## 2019-06-18 DIAGNOSIS — F60.9 PERSONALITY DISORDER (HCC): ICD-10-CM

## 2019-06-18 DIAGNOSIS — F41.8 DEPRESSION WITH ANXIETY: Primary | ICD-10-CM

## 2019-06-18 RX ORDER — MIRTAZAPINE 15 MG/1
TABLET, ORALLY DISINTEGRATING ORAL
Qty: 30 TAB | Refills: 3 | Status: SHIPPED | OUTPATIENT
Start: 2019-06-18 | End: 2019-10-14 | Stop reason: SDUPTHER

## 2019-06-18 RX ORDER — VENLAFAXINE HYDROCHLORIDE 150 MG/1
CAPSULE, EXTENDED RELEASE ORAL
Qty: 90 CAP | Refills: 1 | Status: SHIPPED | OUTPATIENT
Start: 2019-06-18 | End: 2019-10-14 | Stop reason: SDUPTHER

## 2019-06-18 RX ORDER — ARIPIPRAZOLE 5 MG/1
TABLET ORAL
Qty: 30 TAB | Refills: 3 | Status: SHIPPED | OUTPATIENT
Start: 2019-06-18 | End: 2019-10-14 | Stop reason: SDUPTHER

## 2019-06-18 NOTE — PROGRESS NOTES
Psychiatric Outpatient Progress Note    Account Number:  [id number]  Name: Marciano Landis    SUBJECTIVE:   CHIEF COMPLAINT:  Marciano Landis is a [de-identified]. o. , white female and was seen today for 2 month follow-up of psychiatric condition and psychotropic medication management. She was brought by her . She is carrying her portable oxygen tank and receiving oxygen via [place].       HPI:   Aspen Mcfarland reports the following psychiatric symptoms: depression and anxiety. The symptoms have been present for years and are of mild-moderate severity. The symptoms occur infrequently.      Pt was seen with her . She reports that she is doing good. She is on  24/7 oxygen. She is eating better yet weight has gone down. She is not sleeping that well. She denies any psychosis or [place]. Compliant with medications.     Weight is down by 6 lbs. . BMI =  24.  BP/HR is WNL. She is walking some at home. Medically stable. She is frail.  is her primary care provider. They have decided against going into [facility].     Contributing factors include: medical/surgical illnesses, polypharmacy, 24/7 oxygen     Patient denies SI/HI/SIB.     Side Effects: none       Fam/Soc Hx (from [facility] Eval with updates):    Ethnic:   Relationship Status:  x years. One on and one daughter. 4 grandchildren. Living Situation: With spouse   Employment: retired . 2 years of college.    Tobacco/Caffeine/Alchol use: daily 2-3  shots of Vodka   Illicit Drug Social History:  no history of illicit drug use  Hobbies:  TV  Sexual:  heterosexual      REVIEW OF SYSTEMS:  Constitutional: positive for fatigue  Respiratory: positive for dyspnea on exertion  Cardiovascular: negative for chest pain, palpitations  Musculoskeletal:positive for myalgias and arthralgias  Neurological: positive for memory problems and gait problems  Behavioral/Psych: positive for anxiety,   Appetite:good   Sleep: poor     SCALES:(3/3/16)  MMSE; 28/30  GDS: 6/15    Visit Vitals  /85   Pulse 98   Ht 5' 4\" (1.626 m)   Wt 64.4 kg (142 lb)   BMI 24.37 kg/m²       OBJECTIVE:                 Mental Status exam: WNL except for      Sensorium  oriented to time, place and person   Relations cooperative and passive    Eye Contact    appropriate   Appearance:  age appropriate and casually dressed   Motor Behavior/Gait:  within normal limits   Speech:  normal pitch and normal volume   Thought Process: goal directed and logical   Thought Content free of delusions and free of hallucinations   Suicidal ideations none   Homicidal ideations none   Mood:  euthymic   Affect:  full range   Memory recent  impaired   Memory remote:  adequate   Concentration:  impaired   Abstraction:  concrete   Insight:  fair   Reliability fair   Judgment:  fair       MEDICAL DECISION MAKING  Data: pertinent labs, imaging, medical records and diagnostic tests reviewed and incorporated in diagnosis and treatment plan    No Known Allergies     Current Outpatient Medications   Medication Sig Dispense Refill    ARIPiprazole (ABILIFY) 5 mg tablet TAKE 1/2 TABLET BY MOUTH EVERY DAY 30 Tab 3    mirtazapine (REMERON SOL-TAB) 15 mg disintegrating tablet TAKE 1 TABLET BY MOUTH EVERY DAY AT NIGHT 30 Tab 3    venlafaxine-SR (EFFEXOR-XR) 150 mg capsule TAKE ONE CAPSULE BY MOUTH EVERY MORNING AFTER BREAKFAST 90 Cap 1    PIRFENIDONE (ESBRIET PO) Take  by mouth three (3) times daily.  topiramate (TOPAMAX) 100 mg tablet Take 100 mg by mouth daily. Indications: trigeminal neuralgia  5    atorvastatin (LIPITOR) 20 mg tablet Take 20 mg by mouth daily. Indications: HYPERCHOLESTEROLEMIA      aspirin delayed-release 325 mg tablet Take 81 mg by mouth daily. Indications: myocardial infarction prevention      levETIRAcetam (KEPPRA) 500 mg tablet Take 500 mg by mouth daily. Indications: atypical trigeminal neuralgia      levothyroxine (SYNTHROID) 88 mcg tablet Take 88 mcg by mouth Daily (before breakfast). Indications: HYPOTHYROIDISM            Problems addressed today:    ICD-10-CM ICD-9-CM    1. Depression with anxiety F41.8 300.4    2. Personality disorder ([facility] F60.9 301.9        Assessment:   Orlin Amin  is a [de-identified] y.o.  female  is responding to treatment. Symptoms are stable. Patient denies SI/HI/SIB. No evidence of AH/VH or delusions. Risk Scoring- chronic illnesses and prescription drug management    Treatment Plan:  1. Medications:          Medication Changes/Adjustments: Continue combination of Remeron, Abilify and Effexor in the current dosages. Current Outpatient Medications   Medication Sig Dispense Refill    ARIPiprazole (ABILIFY) 5 mg tablet TAKE 1/2 TABLET BY MOUTH EVERY DAY 30 Tab 3    mirtazapine (REMERON SOL-TAB) 15 mg disintegrating tablet TAKE 1 TABLET BY MOUTH EVERY DAY AT NIGHT 30 Tab 3    venlafaxine-SR (EFFEXOR-XR) 150 mg capsule TAKE ONE CAPSULE BY MOUTH EVERY MORNING AFTER BREAKFAST 90 Cap 1    PIRFENIDONE (ESBRIET PO) Take  by mouth three (3) times daily.  topiramate (TOPAMAX) 100 mg tablet Take 100 mg by mouth daily. Indications: trigeminal neuralgia  5    atorvastatin (LIPITOR) 20 mg tablet Take 20 mg by mouth daily. Indications: HYPERCHOLESTEROLEMIA      aspirin delayed-release 325 mg tablet Take 81 mg by mouth daily. Indications: myocardial infarction prevention      levETIRAcetam (KEPPRA) 500 mg tablet Take 500 mg by mouth daily. Indications: atypical trigeminal neuralgia      levothyroxine (SYNTHROID) 88 mcg tablet Take 88 mcg by mouth Daily (before breakfast). Indications: HYPOTHYROIDISM                    The following regarding medications was addressed:    (The risks and benefits of the proposed medication; the potential medication side effects ie    dry mouth, weight gain, GI upset, headache; patient given opportunity to ask questions)       2.   Counseling and coordination of care including instructions for treatment, risks/benefits, risk factor reduction and patient/family education. She/ agrees with the plan. Patient/ instructed to call with any side effects, questions or issues. 3.  Pt and  were educated on keeping healthy weight and cautioned about her continues weight loss. They were informed about my departure from this practice. They agreed to FU with Dr. Emery Babinski in this practice. PSYCHOTHERAPY:  approx 20 minutes  Type:  Supportive/Solution Focused psychotherapy provided  Focus:     Current problems:   CMI   Medical issues    Psychoeducation provided:  Psych medications. Treatment plan reviewed with patient/-including diagnosis and medications    Aspen Mcfarland is stable. Follow-up and Dispositions    · Return if symptoms worsen or fail to improve.        Marilyn Damon MD  6/18/2019

## 2019-06-26 ENCOUNTER — OFFICE VISIT (OUTPATIENT)
Dept: BEHAVIORAL/MENTAL HEALTH CLINIC | Age: 79
End: 2019-06-26

## 2019-06-26 DIAGNOSIS — F41.8 DEPRESSION WITH ANXIETY: Primary | ICD-10-CM

## 2019-06-26 NOTE — PROGRESS NOTES
PSYCHOTHERAPY NOTE      Claudette Promise is a [de-identified] y.o. female who presents with depression/anxiety. Client was seen for an individual psychotherapy session that lasted for 50 minutes. Progress:  Client presents with brighter mood and affect. She has been doing more socially. She went to a couple of Judaism functions. This was a big step as she was ambivalent to attend Judaism since having to be on oxygen. When she attended Judaism, she noticed two other members of the Muslim also with oxygen machines and this helped her. She states sometimes during moments of prayer/quiet, she became a little anxious, but she was met with a lot of support from her friends and plans to continue to attend. Client and  also went to the [employer] and to her grandson's graduation. She enjoyed both events and feels pleased with her concerted efforts to do more. She continues to have some communication challenges with daughter who will often trigger her and we discussed ways today to improve their overall communication. Overall, client demonstrating improvement.      Mental Status exam:         Sensorium  oriented to time, place and person   Relations cooperative   Appearance:  age appropriate and casually dressed   Motor Behavior:  within normal limits   Speech:  normal pitch and normal volume   Thought Process: goal directed and logical   Thought Content free of delusions and free of hallucinations   Suicidal ideations none   Homicidal ideations none   Mood:  euthymic   Affect:  mood-congruent   Memory recent  adequate   Memory remote:  adequate   Concentration:  adequate   Abstraction:  abstract   Insight:  fair   Reliability fair   Judgment:  fair         DIAGNOSIS AND IMPRESSION:    Axis I: Depression with anxiety  Axis II: No diagnosis    Axis III:   Past Medical History:   Diagnosis Date    Depression     Hypercholesterolemia     Thyroid disease      Axis IV: Problems with primary support group, Problems related to social environment, Problems with access to health care services and Other psychosocial or environmental problems  Axis V:  61-70 mild symptoms    Interventions/plans: Follow up in 3-4 weeks.

## 2019-07-24 ENCOUNTER — OFFICE VISIT (OUTPATIENT)
Dept: BEHAVIORAL/MENTAL HEALTH CLINIC | Age: 79
End: 2019-07-24

## 2019-07-24 DIAGNOSIS — F41.8 DEPRESSION WITH ANXIETY: Primary | ICD-10-CM

## 2019-07-24 NOTE — PROGRESS NOTES
PSYCHOTHERAPY NOTE      Maria Teresa Bradley is a [de-identified] y.o. female who presents with depression/anxiety. Client was seen for an individual psychotherapy session that lasted for 50 minutes. Progress:  Client continues to demonstrate significant progress. She recently travelled to [facility] to attend her [address] reunion. She had a vero time reconnecting with friends and family. She stayed for a week. She also had opportunity to recently spend time with her grandsons and enjoyed this as well. She is doing more and going out more and not allowing being hooked up to oxygen machine to prevent her from \"living my life. \" She is less anxious and less depressed. She is also communicating more effectively with her daughter and doing a better job at setting more healthier limits. Overall, is demonstrating progress. Mental Status exam:         Sensorium  oriented to time, place and person   Relations cooperative   Appearance:  age appropriate and casually dressed   Motor Behavior:  within normal limits   Speech:  normal pitch and normal volume   Thought Process: goal directed and logical   Thought Content free of delusions and free of hallucinations   Suicidal ideations none   Homicidal ideations none   Mood:  euthymic   Affect:  euthymic   Memory recent  adequate   Memory remote:  adequate   Concentration:  adequate   Abstraction:  abstract   Insight:  fair   Reliability fair   Judgment:  fair         DIAGNOSIS AND IMPRESSION:    Axis I: Depression with anxiety  Axis II: No diagnosis  Axis III:   Past Medical History:   Diagnosis Date    Depression     Hypercholesterolemia     Thyroid disease      Axis IV: Problems with primary support group, Problems related to social environment, Problems with access to health care services and Other psychosocial or environmental problems  Axis V:  61-70 mild symptoms    Interventions/plans:   Follow up in one month

## 2019-09-20 ENCOUNTER — OFFICE VISIT (OUTPATIENT)
Dept: BEHAVIORAL/MENTAL HEALTH CLINIC | Age: 79
End: 2019-09-20

## 2019-09-20 DIAGNOSIS — F41.8 DEPRESSION WITH ANXIETY: Primary | ICD-10-CM

## 2019-09-20 NOTE — PROGRESS NOTES
PSYCHOTHERAPY NOTE      Chandler Ott is a [de-identified] y.o. female who presents with depression. Client was seen for an individual psychotherapy session that lasted for 50 minutes. Progress:  Client continues to demonstrate improvement. Presents with good mood and affect. Is less depressed and less anxious. Is more socially active. Is navigating relationship with family members better and does not allow it to negatively affect her mood. Her older brother passed away a couple of weeks ago and she endured this loss well. He as 95 and had been sick for many years. She reconnected with several family members at . Overall, client demonstrating improvement. Did inform client of change of my role effective Dec. 1 and provided her with list of counseling resources. Processed this loss in session today, too. Mental Status exam:         Sensorium  oriented to time, place and person   Relations cooperative   Appearance:  age appropriate and casually dressed   Motor Behavior:  within normal limits   Speech:  normal pitch and normal volume   Thought Process: goal directed and logical   Thought Content free of delusions and free of hallucinations   Suicidal ideations none   Homicidal ideations none   Mood:  euthymic   Affect:  mood-congruent   Memory recent  adequate   Memory remote:  adequate   Concentration:  adequate   Abstraction:  abstract   Insight:  fair   Reliability fair   Judgment:  fair         DIAGNOSIS AND IMPRESSION:    Axis I: Depression with anxiety  Axis II: No diagnosis  Axis III:   Past Medical History:   Diagnosis Date    Depression     Hypercholesterolemia     Thyroid disease      Axis IV: Problems with primary support group, Problems related to social environment, Problems with access to health care services and Other psychosocial or environmental problems  Axis V:  61-70 mild symptoms    Interventions/plans:   Follow up in one month

## 2019-10-14 ENCOUNTER — OFFICE VISIT (OUTPATIENT)
Dept: BEHAVIORAL/MENTAL HEALTH CLINIC | Age: 79
End: 2019-10-14

## 2019-10-14 VITALS
HEART RATE: 117 BPM | SYSTOLIC BLOOD PRESSURE: 136 MMHG | DIASTOLIC BLOOD PRESSURE: 84 MMHG | BODY MASS INDEX: 24.92 KG/M2 | HEIGHT: 64 IN | WEIGHT: 146 LBS

## 2019-10-14 DIAGNOSIS — F60.9 PERSONALITY DISORDER (HCC): ICD-10-CM

## 2019-10-14 DIAGNOSIS — F41.8 DEPRESSION WITH ANXIETY: Primary | ICD-10-CM

## 2019-10-14 RX ORDER — MIRTAZAPINE 15 MG/1
TABLET, ORALLY DISINTEGRATING ORAL
Qty: 90 TAB | Refills: 2 | Status: SHIPPED | OUTPATIENT
Start: 2019-10-14 | End: 2020-06-29 | Stop reason: SDUPTHER

## 2019-10-14 RX ORDER — ARIPIPRAZOLE 5 MG/1
TABLET ORAL
Qty: 45 TAB | Refills: 2 | Status: SHIPPED | OUTPATIENT
Start: 2019-10-14 | End: 2020-06-29 | Stop reason: SDUPTHER

## 2019-10-14 RX ORDER — VENLAFAXINE HYDROCHLORIDE 150 MG/1
CAPSULE, EXTENDED RELEASE ORAL
Qty: 90 CAP | Refills: 2 | Status: SHIPPED | OUTPATIENT
Start: 2019-10-14 | End: 2020-06-29

## 2019-10-14 NOTE — PROGRESS NOTES
Kesha Obrien  1940  [de-identified] y.o.  female  [facility]    Chief Complaint   Patient presents with    Depression     Doing well on her current medications    Anxiety     Doing well on her current medications    Insomnia     Reported middle insomnia wake up every 1-2 hours and take some time to go back to sleep    Stress     Psychosocial       [place]:   This is a [de-identified]years old   female with past psychiatric history and treatment of depression and anxiety who was under care of Dr. Ramon Salas last seen on June 18, 2019 when she was prescribed Abilify 2.5 mg daily, mirtazapine disintegrating tablet 15 mg at bedtime and Effexor  mg daily. 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 She is also receiving regular psychotherapy in our practice by Ms. Hussein Sales last seen September 20 and according to her note she is doing well with her symptoms of depression and anxiety. Patient is started to see Dr. Ramon Salas since March 3, 2016 with symptoms of depression and anxiety. She was brought by her  and Dr Navarro President received 3 page letter from her daughter, Romana Du. Hortensia Shirley, describing in detail her perspective of mother's mental illness and her struggle with her. She also went into the years of family dynamics which probably has affected everyone but patient is identified only as mentally ill. Patient is started to see Dr. Melva Heller from 2011 when she was on Effexor and Wellbutrin was added. She presented on time today along with her  of 61 years who also provided some history. She report compliance with all of her medication, is able to tolerate, and requested to continue current treatment plan and return in 3 months for evaluation. She denies any depression and anxiety at this time and was educated about each and every 1 of her medication with respect to indication, side effect, and expectation.   She lives with her  and denies any psychosocial stress at this time.     Social History:      Ethnic:   Relationship Status:  x years. One on and one daughter. 4 grandchildren. Living Situation: With spouse   Employment: retired . 2 years of college. Tobacco/Caffeine/Alchol use: daily 2-3  shots of Vodka   Illicit Drug Social History:  no history of illicit drug use  Hobbies:  TV  Sexual:  heterosexual     Family History:   History reviewed. No pertinent family history. SUBSTANCE USE HISTORY:   Patient is smoke about 40 years and quit his smoking 17 years ago. She drinks 2 glasses of vodka daily and at times 3 glasses probably once a week and denies any abuse. She denies any drug abuse ever. MEDICAL HISTORY:    has a past medical history of Depression, Hypercholesterolemia, and Thyroid disease. ALLERGIES:   No Known Allergies    VITAL SIGNS:  /84 (BP 1 Location: Left arm, BP Patient Position: Sitting)   Pulse (!) 117   Ht 5' 4\" (1.626 m)   Wt 66.2 kg (146 lb)   BMI 25.06 kg/m²     Current Outpatient Medications   Medication Sig Dispense Refill    venlafaxine-SR (EFFEXOR-XR) 150 mg capsule TAKE ONE CAPSULE BY MOUTH EVERY MORNING AFTER BREAKFAST 90 Cap 2    mirtazapine (REMERON SOL-TAB) 15 mg disintegrating tablet TAKE 1 TABLET BY MOUTH EVERY DAY AT NIGHT 90 Tab 2    ARIPiprazole (ABILIFY) 5 mg tablet TAKE 1/2 TABLET BY MOUTH EVERY DAY 45 Tab 2    PIRFENIDONE (ESBRIET PO) Take  by mouth three (3) times daily.  topiramate (TOPAMAX) 100 mg tablet Take 100 mg by mouth daily. Indications: trigeminal neuralgia  5    atorvastatin (LIPITOR) 20 mg tablet Take 20 mg by mouth daily. Indications: HYPERCHOLESTEROLEMIA      levETIRAcetam (KEPPRA) 500 mg tablet Take 500 mg by mouth daily. Indications: atypical trigeminal neuralgia      levothyroxine (SYNTHROID) 88 mcg tablet Take 88 mcg by mouth Daily (before breakfast).  Indications: HYPOTHYROIDISM         ROS:  Constitutional: Positive for nasal cannula, negative for fever  Eyes: Negative for contacts/glasses  ENT: Negative for hearing loss and tinnitus  Respiratory: Continues oxygen via nasal cannula for idiopathic pulmonary fibrosis  Neurological: Positive for memory problems  Behavioral/psych: Positive for insomnia, anxiety, depression, negative for SI/HI  Endocrine: Negative for diabetic symptoms including polyuria and polydipsia    MENTAL STATUS EXAMINATION:   Patient is a [de-identified] y.o. female [facility] who looks her stated age. She is nicely dressed with good hygiene and had continuous oxygen via nasal cannula which is 24 7. Her speech is slow due to breathing but regular rate and rhythm, fluent language, and thought process is linear, logical, and goal directed. Reported mood is good with mood congruent euthymic affect. Patient denies any suicidal ideation, homicidal ideation, and auditory visual hallucination. Not observed to be delusional or paranoid. Insight, judgement, reliability and impulse control is intact. Her cognition is within normal limit and she is observed to be reliable. DIAGNOSIS:  Encounter Diagnoses   Name Primary?  Depression with anxiety Yes    Personality disorder ([facility]        PLAN:  1. MEDICATION:   1. 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 She understand possibility of metabolic side effects. 2.  Depression, anxiety, insomnia, poor appetite: Remeron 15 mg disintegrating tablet at bedtime. 3.  Depression and anxiety: Effexor  mg daily. Patient and her  were provided with psycho education, discussed risk/benefits, and expectations from medication changes. Patient agrees with plan. 2. PSYCHOTHERAPY: Patient was provided with supportive therapy, strongly encourage to seek psychotherapy. She is receiving psychotherapy in our practice by Ms. Oliver Elmore and has been seen 3 times in past 3 months.   Per her last note on September 20 she is doing well on her current medication and combination psychotherapy    3. MEDICAL CARE: Patient was strongly encourage to take their medical medications and follow up with their PCP on regular basis. Her weight today is 146 pounds, blood pressure is 136/84, and pulse is 117. She is taking combination of Keppra and Topamax for atypical facial pain and has a neurologist Dr. Marci Zuluaga she was taking care. She also has hypothyroidism and hypercholesterolemia. She has continuous oxygen via nasal cannula due to idiopathic pulmonary fibrosis diagnosed in February 2017.    4. SUBSTANCE ABUSE CARE: Patient quit his smoking about 17 years ago, drink 2 glass of vodka daily and maybe once a week 3 glasses for past 20 years and denies any abuse. 5. FOLLOW UP:   Follow-up and Dispositions    · Return in about 3 months (around 1/14/2020) for Medication management.

## 2019-10-18 ENCOUNTER — OFFICE VISIT (OUTPATIENT)
Dept: BEHAVIORAL/MENTAL HEALTH CLINIC | Age: 79
End: 2019-10-18

## 2019-10-18 DIAGNOSIS — F41.8 DEPRESSION WITH ANXIETY: Primary | ICD-10-CM

## 2019-10-18 NOTE — PROGRESS NOTES
PSYCHOTHERAPY NOTE      Vernon Bailey is a [de-identified] y.o. female who presents with depression/anxiety. Client was seen for an individual psychotherapy session that lasted for 50 minutes. Progress:  Client continues to demonstrate improvement. Is less depressed and less anxious. Today she addressed some issues with family members that is causing her some distress. One is with brother, whom she feels takes advantage of their vacation home and the other is with son, who asks for loan. We discussed this in session. Explored ways to set and maintain healthy boundaries and limits to reduce distress and focused on improving coping strategies. Mental Status exam:         Sensorium  oriented to time, place and person   Relations cooperative   Appearance:  age appropriate and casually dressed   Motor Behavior:  within normal limits   Speech:  normal pitch and normal volume   Thought Process: goal directed and logical   Thought Content free of delusions and free of hallucinations   Suicidal ideations none   Homicidal ideations none   Mood:  euthymic   Affect:  mood-congruent   Memory recent  adequate   Memory remote:  adequate   Concentration:  adequate   Abstraction:  abstract   Insight:  fair   Reliability fair   Judgment:  fair         DIAGNOSIS AND IMPRESSION:    Axis I: Depression with anxiety  Axis II: No diagnosis  Axis III:   Past Medical History:   Diagnosis Date    Depression     Hypercholesterolemia     Thyroid disease      Axis IV: Problems with primary support group, Problems related to social environment, Problems with access to health care services and Other psychosocial or environmental problems  Axis V:  61-70 mild symptoms    Interventions/plans: Follow up in 2 weeks.

## 2019-11-11 ENCOUNTER — OFFICE VISIT (OUTPATIENT)
Dept: BEHAVIORAL/MENTAL HEALTH CLINIC | Age: 79
End: 2019-11-11

## 2019-11-11 DIAGNOSIS — F41.8 DEPRESSION WITH ANXIETY: Primary | ICD-10-CM

## 2020-02-28 ENCOUNTER — OFFICE VISIT (OUTPATIENT)
Dept: BEHAVIORAL/MENTAL HEALTH CLINIC | Age: 80
End: 2020-02-28

## 2020-02-28 VITALS
HEIGHT: 64 IN | SYSTOLIC BLOOD PRESSURE: 129 MMHG | BODY MASS INDEX: 26.12 KG/M2 | HEART RATE: 112 BPM | WEIGHT: 153 LBS | DIASTOLIC BLOOD PRESSURE: 86 MMHG

## 2020-02-28 DIAGNOSIS — F41.8 DEPRESSION WITH ANXIETY: ICD-10-CM

## 2020-02-28 DIAGNOSIS — G47.00 INSOMNIA DISORDER WITH NON-SLEEP DISORDER MENTAL COMORBIDITY: Primary | ICD-10-CM

## 2020-02-28 RX ORDER — TRAZODONE HYDROCHLORIDE 50 MG/1
50 TABLET ORAL
Qty: 30 TAB | Refills: 2 | Status: SHIPPED | OUTPATIENT
Start: 2020-02-28 | End: 2020-06-29 | Stop reason: SDUPTHER

## 2020-02-28 NOTE — PROGRESS NOTES
Psychiatric Outpatient Progress Note    Account Number:  [id number]  Name: Salvatore Klein    SUBJECTIVE:     CHIEF COMPLAINT:    HPI:  Salvatore Klein is a 79 y.o. female and was seen today for follow-up of psychiatric condition and psychotropic medication management. [facility] Modesto Vega [facility] She has never been hospitalized to a psychiatric unit. She has never attempted suicide. 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 She worked as his . She has attended two years of college and worked for her , [employer] ( now retired). She was 1/11 children born to an intact marriage. NO history of trauma. She has one son and one daughter and still living with her  of 61 years. They are searching for appropriate care home homes. 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 She is s/p fracture of the hip in 2018.  ----------------------------------------------------------------------------------------------------------------------------------------------------------------------------------------------------------------------------------------------------------------------------------------------------------------------------------------------------------------------------------------------------------------------------------------------------------------------------------------------------------  Course of events since last visit: The patient returns for her scheduled appointment with her  for the first time to this provider. Her records were reviewed dating back to when she was a patient of Dr. Santiago Suazo in 2011. She was her patient until March, 2016 after which Emilie Curry took over until June.2019. She saw Dr. Sammie Lawrence one time in Oct. 2019.  She has been prescribed medication for anxiety and depression. She was working with [facility] until last year,11/2019. She is responding well to her current medications and her only complaint is waking up too early , around 4 am after falling asleep around 11-11:30 pm    Fam/Social STATUS  OR changes: none other than one of her brothers dying in November. Now she and one brother are still alive. Side Effects:  none      REVIEW OF SYSTEMS:  Pertinent items are noted in HPI/above. Visit Vitals  /86 (BP 1 Location: Left arm, BP Patient Position: Sitting)   Pulse (!) 112   Ht 5' 4\" (1.626 m)   Wt 69.4 kg (153 lb)   BMI 26.26 kg/m²       OBJECTIVE:                 Mental Status exam: WNL except for      Attitude/Behavior  cooperative,     Eye Contact    appropriate   Appearance:  age appropriate and well dressed with O2 canula/tank   Motor Behavior/Gait:  within normal limits   Speech:  normal pitch, normal volume and non-pressured   Thought Process: goal directed and logical linear   Thought Content free of delusions and obsessions   Perceptual distortions  Patient denied any visual,auditory,olfactory or gustatory hallucinations. No illusions were reported or noted eithter   Suicidal ideations no plan  and no intention   Homicidal ideations no plan  and no intention   Mood:  euthymic   Affect:  euthymic     Orientation/sensorium  Alert and oriented to  date,place, situation and persons   Memory recent  adequate   Memory remote:  adequate   Concentration:  adequate   Abstraction:  abstract   Judgment &Insight:  good   Reliability good           MEDICAL DECISION MAKING:     Data REVIEWED: pertinent labs, imaging, medical records and diagnostic tests reviewed and incorporated in diagnosis and treatment plan    No Known Allergies     Current Outpatient Medications   Medication Sig Dispense Refill    traZODone (DESYREL) 50 mg tablet Take 1 Tab by mouth nightly.  Start with 1/2 -1 tablet as tolerated for insomnia 30 Tab 2    venlafaxine-SR (EFFEXOR-XR) 150 mg capsule TAKE ONE CAPSULE BY MOUTH EVERY MORNING AFTER BREAKFAST 90 Cap 2    mirtazapine (REMERON SOL-TAB) 15 mg disintegrating tablet TAKE 1 TABLET BY MOUTH EVERY DAY AT NIGHT 90 Tab 2    ARIPiprazole (ABILIFY) 5 mg tablet TAKE 1/2 TABLET BY MOUTH EVERY DAY 45 Tab 2    PIRFENIDONE (ESBRIET PO) Take  by mouth three (3) times daily.  topiramate (TOPAMAX) 100 mg tablet Take 100 mg by mouth daily. Indications: trigeminal neuralgia  5    atorvastatin (LIPITOR) 20 mg tablet Take 20 mg by mouth daily. Indications: HYPERCHOLESTEROLEMIA      levETIRAcetam (KEPPRA) 500 mg tablet Take 500 mg by mouth daily. Indications: atypical trigeminal neuralgia      levothyroxine (SYNTHROID) 88 mcg tablet Take 88 mcg by mouth Daily (before breakfast). Indications: HYPOTHYROIDISM            ICD-10-CM ICD-9-CM    1. Insomnia disorder with non-sleep disorder mental comorbidity G47.00 780.52 traZODone (DESYREL) 50 mg tablet   2. Depression with anxiety F41.8 300.4        Orders Placed This Encounter    traZODone (DESYREL) 50 mg tablet     Sig: Take 1 Tab by mouth nightly. Start with 1/2 -1 tablet as tolerated for insomnia     Dispense:  30 Tab     Refill:  2           Assessment:   Perez Castillo  is a 79 y.o.  female  is  responding to treatment. Symptoms have subsided but she still is waking up too early in the morning, 4 am and feels tired. She has taken the Melatonin with the Mirtazapine w/o any benefit. She is able to fall asleep but not able to maintain for longer time. Patient denies SI/HI/SIB  SUICIDE RISK:      Treatment Plan  Treatment plan reviewed with patient-including diagnosis and medications:    1. Medication Changes/Adjustments: Will continue her  meds since she has 2 refills left and add Trazadone for insomnia. Current Outpatient Medications   Medication Sig Dispense Refill    traZODone (DESYREL) 50 mg tablet Take 1 Tab by mouth nightly.  Start with 1/2 -1 tablet as tolerated for insomnia 30 Tab 2    venlafaxine-SR (EFFEXOR-XR) 150 mg capsule TAKE ONE CAPSULE BY MOUTH EVERY MORNING AFTER BREAKFAST 90 Cap 2    mirtazapine (REMERON SOL-TAB) 15 mg disintegrating tablet TAKE 1 TABLET BY MOUTH EVERY DAY AT NIGHT 90 Tab 2    ARIPiprazole (ABILIFY) 5 mg tablet TAKE 1/2 TABLET BY MOUTH EVERY DAY 45 Tab 2    PIRFENIDONE (ESBRIET PO) Take  by mouth three (3) times daily.  topiramate (TOPAMAX) 100 mg tablet Take 100 mg by mouth daily. Indications: trigeminal neuralgia  5    atorvastatin (LIPITOR) 20 mg tablet Take 20 mg by mouth daily. Indications: HYPERCHOLESTEROLEMIA      levETIRAcetam (KEPPRA) 500 mg tablet Take 500 mg by mouth daily. Indications: atypical trigeminal neuralgia      levothyroxine (SYNTHROID) 88 mcg tablet Take 88 mcg by mouth Daily (before breakfast). Indications: HYPOTHYROIDISM               The risks, benefits of the proposed medication and the potential medication side effects (ie dry mouth, weight gain, GI upset, headache,tremors, extrapyramidal side effects, sexual dysfunction, suicidal thoughts,sweating) etc.were discussed . The potential for dependence and addiction discussed. The patient was given the opportunity to ask questions. The patient was informed of the consequences of refusing medications and non-compliance. Patient agreed with this plan. PSYCHOTHERAPY:  approx 30 minutes  Supportive/Cognitive Behavioral/Solution Focused psychotherapy provided  Discussed rational versus irrational thinking patterns and their consequences. Discussed healthy/adaptive and unhealthy/maladaptive coping. Homework given regarding:   Psycho-education/ handouts provided:  none    LABORATORY ORDERS & OR REFERRALS:     Patient instructed to call or e-mail to [facility] with any side effects, questions or issues. Ms. Brenna Stovall has a reminder for a \"due or due soon\" health maintenance.  I have asked that she contact her primary care provider for follow-up on this health maintenance. Aspen Mcfarland is progressing/stable. Follow-up and Dispositions    · Return in about 4 months (around 6/28/2020). Sakina Vargas MD  2/28/2020      TIME SPENT FACE TO FACE WITH THE PATIENT: 30 MINUTES minute visit spent counseling regarding her extensive symptoms, reviewing previous records and coordinating care. There are other unrelated non-urgent complaints, but due to the busy schedule and the amount of time I've already spent with her, time does not permit me to address these routine issues at today's visit. I've requested another appointment to review these additional issues.

## 2020-06-29 ENCOUNTER — VIRTUAL VISIT (OUTPATIENT)
Dept: BEHAVIORAL/MENTAL HEALTH CLINIC | Age: 80
End: 2020-06-29

## 2020-06-29 DIAGNOSIS — G47.00 INSOMNIA DISORDER WITH NON-SLEEP DISORDER MENTAL COMORBIDITY: ICD-10-CM

## 2020-06-29 DIAGNOSIS — F41.8 DEPRESSION WITH ANXIETY: Primary | ICD-10-CM

## 2020-06-29 RX ORDER — MIRTAZAPINE 15 MG/1
TABLET, ORALLY DISINTEGRATING ORAL
Qty: 30 TAB | Refills: 9 | Status: SHIPPED | OUTPATIENT
Start: 2020-06-29

## 2020-06-29 RX ORDER — TRAZODONE HYDROCHLORIDE 50 MG/1
50 TABLET ORAL
Qty: 30 TAB | Refills: 9 | Status: SHIPPED | OUTPATIENT
Start: 2020-06-29

## 2020-06-29 RX ORDER — ARIPIPRAZOLE 5 MG/1
TABLET ORAL
Qty: 15 TAB | Refills: 9 | Status: SHIPPED | OUTPATIENT
Start: 2020-06-29

## 2020-06-29 RX ORDER — VENLAFAXINE HYDROCHLORIDE 150 MG/1
CAPSULE, EXTENDED RELEASE ORAL
Qty: 30 CAP | Refills: 9 | Status: SHIPPED | OUTPATIENT
Start: 2020-06-29

## 2020-06-29 NOTE — PROGRESS NOTES
HPI:  Aalina Drake is a 79 y.o. female and was seen today for follow-up of psychiatric condition and psychotropic medication management. Mrs. Elo Zaragoza has been receiving psychiatric treatment since 2011 thru [facility] practice. Her records were reviewed dating back to when she was a patient of Dr. Enoc Olivares in 2011. She was her patient until March, 2016 after which Jerrica Henry took over until June.2019. She saw Dr. Park Cummins one time in Oct. 2019. She has been prescribed medication for anxiety and depression. She was working with [facility] until last year,11/2019. She has never been hospitalized to a psychiatric unit. She has never attempted suicide. 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 She worked as his . She has attended two years of college and worked for her , [employer] ( now retired). She was 1/11 children born to an intact marriage. NO history of trauma. She has one son and one daughter and still living with her  of 61 years. They are searching for appropriate shelter homes. 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 She is s/p fracture of the hip in 2018.  ---------------------------------------------------------------------------------------------------------------------------------------------------------------------------------------------------------------------. Alaina Drake is a 79 y.o. female evaluated via audio only technology on 6/29/2020.       Consent: She and/or her health care decision maker is aware that she may receive a bill for this audio only encounter, depending on her insurance coverage, and has provided verbal consent to proceed: Yes    I communicated with the patient and/or health care decision maker about the nature and details of the following:  Assessment & Plan:   Diagnoses and all orders for this visit:    1. Depression with anxiety  -     ARIPiprazole (ABILIFY) 5 mg tablet; TAKE 1/2 TABLET BY MOUTH EVERY DAY  -     mirtazapine (REMERON SOL-TAB) 15 mg disintegrating tablet; TAKE 1 TABLET BY MOUTH EVERY DAY AT NIGHT  -     venlafaxine-SR (EFFEXOR-XR) 150 mg capsule; TAKE ONE CAPSULE BY MOUTH EVERY MORNING AFTER BREAKFAST    2. Insomnia disorder with non-sleep disorder mental comorbidity  -     traZODone (DESYREL) 50 mg tablet; Take 1 Tab by mouth nightly. Start with 1/2 -1 tablet as tolerated for insomnia      She was aware of this provider's departure. 12  Subjective:   Margret Russell is a 79 y.o. female who was contacted  for Follow-up   She is responding well to her current medications. In the last visit, her main complaint was lack of adequate sleep, waking up at 4am, but she was placed on Trazadone and has been sleeping longer, better. She is requesting a refill. She is looking forward to her appointment with her pulmonary specialist regarding \" Pulmonary fibrosis\" for which she has become Oxygen dependent. She is otherwise doing well and requesting 30 day supply of medications with 9 refills.      Fam/Social STATUS  OR changes: none other than one of her brothers dying in November. Now she and one brother are still alive.     Side Effects:  none        Prior to Admission medications    Medication Sig Start Date End Date Taking? Authorizing Provider   traZODone (DESYREL) 50 mg tablet Take 1 Tab by mouth nightly.  Start with 1/2 -1 tablet as tolerated for insomnia 6/29/20  Yes Sultana ROSI Flowers MD   ARIPiprazole (ABILIFY) 5 mg tablet TAKE 1/2 TABLET BY MOUTH EVERY DAY 6/29/20  Yes Sultana ROSI Flowers MD   mirtazapine (REMERON SOL-TAB) 15 mg disintegrating tablet TAKE 1 TABLET BY MOUTH EVERY DAY AT NIGHT 6/29/20  Yes Hilda Snow MD   venlafaxine-SR (EFFEXOR-XR) 150 mg capsule TAKE ONE CAPSULE BY MOUTH EVERY MORNING AFTER BREAKFAST 6/29/20  Yes Hilda Snow MD PIRFENIDONE (ESBRIET PO) Take  by mouth three (3) times daily. Yes Provider, Historical   topiramate (TOPAMAX) 100 mg tablet Take 100 mg by mouth daily. Indications: trigeminal neuralgia 12/3/15  Yes Provider, Historical   atorvastatin (LIPITOR) 20 mg tablet Take 20 mg by mouth daily. Indications: HYPERCHOLESTEROLEMIA 12/31/15  Yes Provider, Historical   levETIRAcetam (KEPPRA) 500 mg tablet Take 500 mg by mouth daily. Indications: atypical trigeminal neuralgia   Yes Provider, Historical   levothyroxine (SYNTHROID) 88 mcg tablet Take 88 mcg by mouth Daily (before breakfast). Indications: HYPOTHYROIDISM   Yes Provider, Historical   traZODone (DESYREL) 50 mg tablet Take 1 Tab by mouth nightly. Start with 1/2 -1 tablet as tolerated for insomnia 2/28/20 6/29/20  Ivanna Mckeon MD   venlafaxine-SR [facility] 150 mg capsule TAKE ONE CAPSULE BY MOUTH EVERY MORNING AFTER BREAKFAST 10/14/19 6/29/20  Blake Amos MD   mirtazapine (REMERON SOL-TAB) 15 mg disintegrating tablet TAKE 1 TABLET BY MOUTH EVERY DAY AT NIGHT 10/14/19 6/29/20  Blake Amos MD   ARIPiprazole (ABILIFY) 5 mg tablet TAKE 1/2 TABLET BY MOUTH EVERY DAY 10/14/19 6/29/20  Blake Amos MD     No Known Allergies    ROS: None other than issues with her breathing    MSE: She was cooperative, pleasant and w/o any thought or speech disorder. NO cognitive deficits noted as she was fully A&OX3. No perceptual distortions or delusions noted. She was insightful and judgment was intact. She felt stable and sounded euthymic/stable. I affirm this is a Patient-Initiated Episode with a Patient who has not had a related appointment within my department in the past 7 days or scheduled within the next 24 hours.     Total Time: minutes: 5-10 minutes    Note: not billable if this call serves to triage the patient into an appointment for the relevant concern      Stefany Austin MD    REVIEW OF SYSTEMS:  Pertinent items are noted in HPI/above.

## 2021-11-10 ENCOUNTER — TRANSCRIBE ORDER (OUTPATIENT)
Dept: GENERAL RADIOLOGY | Age: 81
End: 2021-11-10

## 2021-11-10 ENCOUNTER — HOSPITAL ENCOUNTER (OUTPATIENT)
Dept: GENERAL RADIOLOGY | Age: 81
Discharge: HOME | End: 2021-11-10
Payer: MEDICARE

## 2021-11-10 DIAGNOSIS — M54.50 LUMBAR PAIN: ICD-10-CM

## 2021-11-10 DIAGNOSIS — M54.50 LUMBAR PAIN: Primary | ICD-10-CM

## 2021-11-10 PROCEDURE — 72100 X-RAY EXAM L-S SPINE 2/3 VWS: CPT

## 2022-03-19 PROBLEM — G47.00 INSOMNIA DISORDER WITH NON-SLEEP DISORDER MENTAL COMORBIDITY: Status: ACTIVE | Noted: 2020-02-28

## 2022-03-20 PROBLEM — E78.00 HYPERCHOLESTEROLEMIA: Status: ACTIVE | Noted: 2017-04-19

## 2022-03-20 PROBLEM — E07.9 THYROID DISEASE: Status: ACTIVE | Noted: 2017-04-19

## 2022-03-20 PROBLEM — G50.0 TRIGEMINAL NEURALGIA: Status: ACTIVE | Noted: 2017-04-19

## 2023-05-11 RX ORDER — VENLAFAXINE HYDROCHLORIDE 150 MG/1
CAPSULE, EXTENDED RELEASE ORAL
COMMUNITY
Start: 2020-06-29

## 2023-05-11 RX ORDER — TOPIRAMATE 100 MG/1
TABLET, FILM COATED ORAL DAILY
COMMUNITY
Start: 2015-12-03

## 2023-05-11 RX ORDER — MIRTAZAPINE 15 MG/1
1 TABLET, ORALLY DISINTEGRATING ORAL NIGHTLY
COMMUNITY
Start: 2020-06-29

## 2023-05-11 RX ORDER — LEVETIRACETAM 500 MG/1
TABLET ORAL DAILY
COMMUNITY

## 2023-05-11 RX ORDER — TRAZODONE HYDROCHLORIDE 50 MG/1
TABLET ORAL
COMMUNITY
Start: 2020-06-29

## 2023-05-11 RX ORDER — ARIPIPRAZOLE 5 MG/1
0.5 TABLET ORAL DAILY
COMMUNITY
Start: 2020-06-29

## 2023-05-11 RX ORDER — ATORVASTATIN CALCIUM 20 MG/1
TABLET, FILM COATED ORAL DAILY
COMMUNITY
Start: 2015-12-31

## 2023-05-11 RX ORDER — LEVOTHYROXINE SODIUM 88 UG/1
TABLET ORAL
COMMUNITY

## 2023-11-10 NOTE — PROGRESS NOTES
[facility] Non-Imaging Procedure Order Form    Patient Name: Emily Tarango Date: 23   Address: [address] [place] [id number] Preferred #:    [phone]       : 2022    Allergies: ALLERGIES:  Patient has no known allergies.    Ordering Physician: Jane Holguin MD  [id number] [facility] [id number]  Dept: [phone]    Performing Provider: [facility]    Insurance: FSC: Payor: [employer] / Plan: CHO[employer] PLUS/[employer] / Product Type: PPO MISC    Priority: routine    Facility: [facility]    Do results need to be sent to another provider?  No    Other:  Physical Therapy, Evaluation & Treatment    Diagnosis: Hypotonia  Special Directions: Please fax notes to Dr. Holguin at [phone]     Electronically Signed by: Jane Holguin MD    23 1:50 PM  Authorizing Provider NPI: [id number]     PSYCHOTHERAPY NOTE      Dione Medrano is a [de-identified] y.o. female who presents with depression/anxiety. Client was seen for an individual psychotherapy session that lasted for 50 minutes. Progress:  Client presents with improved mood and affect. Is less depressed and less anxious. Is more socially active. Continues to struggle with some issues with various family members. Processed this in session. Explored ways to improve her overall communication and to positively work on these relationships. Today is our last session. Reminded client of her progress and strengths and encouraged her to follow up with counseling. Mental Status exam:         Sensorium  oriented to time, place and person   Relations cooperative   Appearance:  age appropriate and casually dressed   Motor Behavior:  within normal limits   Speech:  normal pitch and normal volume   Thought Process: goal directed and logical   Thought Content free of delusions and free of hallucinations   Suicidal ideations none   Homicidal ideations none   Mood:  euthymic   Affect:  mood-congruent   Memory recent  adequate   Memory remote:  adequate   Concentration:  adequate   Abstraction:  abstract   Insight:  fair   Reliability fair   Judgment:  fair         DIAGNOSIS AND IMPRESSION:    Axis I: Depression with anxiety  Axis II: No diagnosis  Axis III:   Past Medical History:   Diagnosis Date    Depression     Hypercholesterolemia     Thyroid disease      Axis IV: Problems with primary support group, Problems related to social environment, Problems with access to health care services and Other psychosocial or environmental problems  Axis V:  61-70 mild symptoms    Interventions/plans:  No follow up scheduled due to my role transitioning.  Client provided with lists of local counselors